# Patient Record
Sex: MALE | Race: WHITE
[De-identification: names, ages, dates, MRNs, and addresses within clinical notes are randomized per-mention and may not be internally consistent; named-entity substitution may affect disease eponyms.]

---

## 2018-03-29 ENCOUNTER — HOSPITAL ENCOUNTER (INPATIENT)
Dept: HOSPITAL 74 - YASAS | Age: 48
LOS: 4 days | Discharge: OTHER INSTITUTION | DRG: 773 | End: 2018-04-02
Attending: INTERNAL MEDICINE | Admitting: INTERNAL MEDICINE
Payer: COMMERCIAL

## 2018-03-29 VITALS — BODY MASS INDEX: 22.3 KG/M2

## 2018-03-29 DIAGNOSIS — F10.230: ICD-10-CM

## 2018-03-29 DIAGNOSIS — Z88.8: ICD-10-CM

## 2018-03-29 DIAGNOSIS — F11.23: Primary | ICD-10-CM

## 2018-03-29 DIAGNOSIS — F32.9: ICD-10-CM

## 2018-03-29 DIAGNOSIS — J20.9: ICD-10-CM

## 2018-03-29 DIAGNOSIS — F19.24: ICD-10-CM

## 2018-03-29 DIAGNOSIS — F14.20: ICD-10-CM

## 2018-03-29 DIAGNOSIS — R63.4: ICD-10-CM

## 2018-03-29 PROCEDURE — HZ2ZZZZ DETOXIFICATION SERVICES FOR SUBSTANCE ABUSE TREATMENT: ICD-10-PCS | Performed by: INTERNAL MEDICINE

## 2018-03-29 RX ADMIN — Medication SCH MG: at 22:26

## 2018-03-29 RX ADMIN — HYDROXYZINE PAMOATE PRN MG: 50 CAPSULE ORAL at 22:26

## 2018-03-29 RX ADMIN — Medication PRN MG: at 22:26

## 2018-03-29 NOTE — HP
COWS





- Scale


Resting Pulse: 0= [place] 80 or Below


Sweatin= Chills/Flushing


Restless Observation: 1= Difficult to Sit Still


Pupil Size: 0= Normal to Room Light


Bone or Joint Aches: 1= Mild Discomfort


Runny Nose/ Eye Tearin= Constantly Teary/Runny


GI Upset > 30mins: 2= Nausea/Diarrhea (BM x 2)


Tremor Observation: 1= Tremor Felt, Not Seen


Yawning Observation: 1= 1-2x During Session


Anxiety or Irritability: 1=Feels Anxious/Irritable


Goose Flesh Skin: 0=Smooth Skin


COWS Score: 12





CIWA Score





- CIWA Score


Nausea/Vomiting: 3


Muscle Tremors: 2


Anxiety: 2


Agitation: 1-Slight > Activity


Paroxysmal Sweats: 3


Orientation: 0-Oriented


Tacttile Disturbances: 0-None


Auditory Disturbances: 0-None


Visual Disturbances: 1-Very Mild Sensitivity


Headache: 0-None Present


CIWA-Ar Total Score: 12





[facility]





- HPI


Chief Complaint: 





alcohol and heroin withdrawal symptoms 


Allergies/Adverse Reactions: 


 Allergies











Allergy/AdvReac Type Severity Reaction Status Date / Time


 


carbamazepine [From Tegretol] Allergy Intermediate Swelling Verified 18 17

:11


 


levetiracetam [From Keppra] Allergy Intermediate Swelling Verified 18 17:

11


 


phenobarbital Allergy Intermediate Swelling Verified 18 17:11


 


phenytoin sodium Allergy Intermediate Swelling Verified 18 17:11





[From Dilantin]     


 


phenytoin sodium extended Allergy Intermediate Swelling Verified 18 17:11





[From Dilantin]     











History of Present Illness: 





46 yo male with IV heroin, nicotine, cocaine and alcohol dependence is here 

seeking detox. 


PMHX: depression and insomnia, Hep C not treated, alcohol related seizure 


Last detox 2 months ago at [facility]. 


Denies suicidal / homicidal ideation or suicide attempts.





Exam Limitations: No Limitations





- Ebola screening


Have you traveled outside of the country in the last 21 days: No


Have you had contact with anyone from an Ebola affected area: No


Have you been sick,other than usual withdrawal symptoms: No


Do you have a fever: No





- Review of Systems


Constitutional: Chills, Diaphoresis, Changes in sleep, Weakness, Unintentional 

Wgt. Loss (10 lbs in the past two months)


EENT: reports: No Symptoms Reported


Respiratory: reports: No Symptoms reported


Cardiac: reports: No Symptoms Reported


GI: reports: Diarrhea, Nausea, Poor Fluid Intake, Vomiting (2x), Indigestion


: reports: No Symptoms Reported


Musculoskeletal: reports: Back Pain


Integumentary: reports: No Symptoms Reported


Neuro: reports: Weakness


Endocrine: reports: Increased Thirst


Hematology: reports: No Symptoms Reported


Psychiatric: reports: Orientated x3, Anxious


Other Systems: Reviewed and Negative





Patient History





- Patient Medical History


Hx Anemia: No


Hx Asthma: No


Hx Chronic Obstructive Pulmonary Disease (COPD): No


Hx Cancer: No


Hx Cardiac Disorders: No


Hx Congestive Heart Failure: No


Hx Hypertension: No


Hx Hypercholesterolemia: No


Hx Pacemaker: No


HX Cerebrovascular Accident: No


Hx Seizures: Yes (ALCOHOL WITHDRAWAL RELATED )


Hx Dementia: No


Hx Diabetes: No


Hx Gastrointestinal Disorders: No


Hx Liver Disease: No


Hx Genitourinary Disorders: No


Hx Sexually Transmitted Disorders: No


Hx Renal Disease (ESRD): No


Hx Thyroid Disease: No


Hx Human Immunodeficiency Virus (HIV): No


Hx Hepatitis C: Yes (treatment)


Hx Depression: Yes


Hx Suicide Attempt: No


Hx Bipolar Disorder: No


Hx Schizophrenia: No





- Patient Surgical History


Past Surgical History: No





- PPD History


Previous Implant?: Yes


Documented Results: Negative w/proof


Date: 10/07/16


PPD to be Administered?: Yes





- Reproductive History


Patient is a Female of Child Bearing Age (11 -55 yrs old): No





- Smoking Cessation


Smoking history: Current every day smoker


Aproximately how many cigarettes per day: 10


Hx Chewing Tobacco Use: No


Initiated information on smoking cessation: Yes


'Breaking Loose' booklet given: 18





- Substance & Tx. History


Hx Alcohol Use: Yes


Hx Substance Use: Yes


Substance Use Type: Alcohol, Cocaine, Heroin


Hx Substance Use Treatment: Yes ([facility] 2 months ago)





- Substances Abused


  ** Alcohol


Route: Oral


Frequency: Daily


Amount used: 2 PINTS


Age of first use: 14


Date of Last Use: 18





  ** Cocaine


Route: Oral


Frequency: Daily


Amount used: $20


Age of first use: 17


Date of Last Use: 18





  ** Heroin


Route: Injection


Frequency: Daily


Amount used: 1 BUNDLE


Age of first use: 17


Date of Last Use: 18





Family Disease History





- Family Disease History


Family Disease History: Other: Father (, [place]), Mother (, [place] )





Admission Physical Exam [facility]





- Vital Signs


Vital Signs: 


 Vital Signs - 24 hr











  18





  16:22


 


Temperature 98.3 F


 


Pulse Rate 80


 


Respiratory 18





Rate 


 


Blood Pressure 118/60














- Physical


General Appearance: Yes: Disheveled, Mild Distress, Thin, Sweating, Anxious


HEENTM: Yes: EOMI, Hearing grossly Normal, Normal ENT Inspection, Pharynx Normal

, Tm's normal, Rhinorrhea, Other (dry mucous membranes)


Respiratory: Yes: Chest Non-Tender, Lungs Clear, No Respiratory Distress, No 

Accessory Muscle Use, Wheezing (left lower lobe)


Neck: Yes: No masses,lesions,Nodules, Trachea in good position


Breast: Yes: Breast Exam Deferred


Cardiology: Yes: Regular Rhythm, Regular Rate


Abdominal: Yes: Normal Bowel Sounds, Non Tender, Flat, Soft


Genitourinary: Yes: Within Normal Limits


Back: Yes: Normal Inspection


Musculoskeletal: Yes: full range of Motion, Gait Steady, Pelvis Stable, Back 

pain


Extremities: Yes: Normal Capillary Refill, Normal Inspection, Normal Range of 

Motion, Non-Tender


Neurological: Yes: CNs II-XII NML intact, Fully Oriented, Alert, Motor Strength 

5/5, Depressed Affect


Integumentary: Yes: Normal Color, Warm, Moist


Lymphatic: Yes: Within Normal Limits





- Diagnostic


(1) Dehydration


Current Visit: Yes   Status: Acute   





(2) Wheezing


Current Visit: Yes   Status: Acute   





(3) Cough


Current Visit: Yes   Status: Acute   





(4) Alcohol dependence with uncomplicated withdrawal


Current Visit: Yes   Status: Acute   





(5) Cocaine dependence, uncomplicated


Current Visit: Yes   Status: Acute   





(6) Hepatitis C


Current Visit: Yes   Status: Chronic   


Qualifiers: 


   Viral hepatitis chronicity: chronic   Hepatic coma status: without hepatic 

coma   Qualified Code(s): B18.2 - Chronic viral hepatitis C   





(7) Opioid dependence with withdrawal


Current Visit: Yes   Status: Acute   





(8) IV drug user


Current Visit: Yes   Status: Acute   





(9) Weight loss


Current Visit: Yes   Status: Acute   





Cleared for Admission [facility]





- Detox or Rehab


[facility] Level of Care: Medically Managed


Detox Regimen/Protocol: Methadone/Librium





[facility] Breath Alcohol Content


Breath Alcohol Content: 0





Urine Drug Screen





- Results


Drug Screen Negative: No


Urine Drug Screen Results: FELISA-Cocaine, OPI-Opiates

## 2018-03-30 LAB
ALBUMIN SERPL-MCNC: 3.1 G/DL (ref 3.4–5)
ALP SERPL-CCNC: 72 U/L (ref 45–117)
ALT SERPL-CCNC: 29 U/L (ref 12–78)
ANION GAP SERPL CALC-SCNC: 5 MMOL/L (ref 8–16)
APPEARANCE UR: CLEAR
AST SERPL-CCNC: 19 U/L (ref 15–37)
BILIRUB SERPL-MCNC: 0.2 MG/DL (ref 0.2–1)
BILIRUB UR STRIP.AUTO-MCNC: 2 MG/DL
BUN SERPL-MCNC: 12 MG/DL (ref 7–18)
CALCIUM SERPL-MCNC: 8.6 MG/DL (ref 8.5–10.1)
CHLORIDE SERPL-SCNC: 107 MMOL/L (ref 98–107)
CO2 SERPL-SCNC: 29 MMOL/L (ref 21–32)
COLOR UR: YELLOW
CREAT SERPL-MCNC: 0.8 MG/DL (ref 0.7–1.3)
DEPRECATED RDW RBC AUTO: 14.2 % (ref 11.9–15.9)
GLUCOSE SERPL-MCNC: 98 MG/DL (ref 74–106)
HCT VFR BLD CALC: 41.1 % (ref 35.4–49)
HGB BLD-MCNC: 13.6 GM/DL (ref 11.7–16.9)
KETONES UR QL STRIP: NEGATIVE
LEUKOCYTE ESTERASE UR QL STRIP.AUTO: NEGATIVE
MCH RBC QN AUTO: 29.1 PG (ref 25.7–33.7)
MCHC RBC AUTO-ENTMCNC: 33.1 G/DL (ref 32–35.9)
MCV RBC: 87.8 FL (ref 80–96)
NITRITE UR QL STRIP: NEGATIVE
PH UR: 5 [PH] (ref 5–8)
PLATELET # BLD AUTO: 234 K/MM3 (ref 134–434)
PMV BLD: 9.3 FL (ref 7.5–11.1)
POTASSIUM SERPLBLD-SCNC: 3.6 MMOL/L (ref 3.5–5.1)
PROT SERPL-MCNC: 6.4 G/DL (ref 6.4–8.2)
PROT UR QL STRIP: NEGATIVE
PROT UR QL STRIP: NEGATIVE
RBC # BLD AUTO: 4.68 M/MM3 (ref 4–5.6)
RBC # UR STRIP: NEGATIVE /UL
SODIUM SERPL-SCNC: 141 MMOL/L (ref 136–145)
SP GR UR: 1.03 (ref 1–1.03)
UROBILINOGEN UR STRIP-MCNC: 2 MG/DL (ref 0.2–1)
WBC # BLD AUTO: 7.4 K/MM3 (ref 4–10)

## 2018-03-30 RX ADMIN — GUAIFENESIN AND DEXTROMETHORPHAN PRN ML: 100; 10 SYRUP ORAL at 05:34

## 2018-03-30 RX ADMIN — GUAIFENESIN AND DEXTROMETHORPHAN PRN ML: 100; 10 SYRUP ORAL at 12:16

## 2018-03-30 RX ADMIN — PHENOL PRN EACH: 14.5 LOZENGE ORAL at 05:35

## 2018-03-30 RX ADMIN — Medication SCH MG: at 22:24

## 2018-03-30 RX ADMIN — Medication SCH TAB: at 11:29

## 2018-03-30 RX ADMIN — HYDROXYZINE PAMOATE PRN MG: 50 CAPSULE ORAL at 11:29

## 2018-03-30 RX ADMIN — Medication PRN MG: at 22:24

## 2018-03-30 RX ADMIN — AMOXICILLIN SCH MG: 500 CAPSULE ORAL at 14:48

## 2018-03-30 RX ADMIN — AMOXICILLIN SCH MG: 500 CAPSULE ORAL at 22:24

## 2018-03-30 RX ADMIN — PHENOL PRN EACH: 14.5 LOZENGE ORAL at 12:16

## 2018-03-30 RX ADMIN — NICOTINE SCH MG: 14 PATCH, EXTENDED RELEASE TRANSDERMAL at 11:30

## 2018-03-30 NOTE — EKG
Test Reason : 

Blood Pressure : ***/*** mmHG

Vent. Rate : 073 BPM     Atrial Rate : 073 BPM

   P-R Int : 122 ms          QRS Dur : 090 ms

    QT Int : 392 ms       P-R-T Axes : 061 070 050 degrees

   QTc Int : 431 ms

 

NORMAL SINUS RHYTHM

NONSPECIFIC T WAVE ABNORMALITY

NO PREVIOUS ECGS AVAILABLE

Confirmed by STEPHON GARCIA MD ([id number]) on 3/30/2018 10:08:06 AM

 

Referred By:             Confirmed By:STEPHON GARCIA MD

## 2018-03-30 NOTE — PN
[facility] CIWA





- CIWA Score


Nausea/Vomiting: 3


Muscle Tremors: 3


Anxiety: 3


Agitation: 3


Paroxysmal Sweats: 1-Minimal Palms Moist


Orientation: 0-Oriented


Tacttile Disturbances: 1-Very Mild Itch/Numbness


Auditory Disturbances: 1-Very Mild


Visual Disturbances: 0-None


Headache: 2-Mild


CIWA-Ar Total Score: 17





[facility] COWS





- Scale


Resting Pulse: 1= [place] 


Sweatin= Chills/Flushing


Restless Observation: 3= Extraneous Movement


Pupil Size: 1= Pupils >than Normal


Bone or Joint Aches: 2= Severe Diffuse Aches


Runny Nose/ Eye Tearin= Runny Nose/Eyes


GI Upset > 30mins: 3= Vomiting/Diarrhea


Tremor Observation of Outstretched Hands: 2= Slight Tremor Visible


Yawning Observation: 1= 1-2x During Session


Anxiety or Irritability: 2=Irritable/Anxious


Goose Flesh Skin: 0=Smooth Skin


COWS Score: 18





BHS Progress Note (SOAP)


Subjective: 





ALERT,IRRITABLE,ANXIOUS,INTERRUPTED SLEEP,TREMOR,PAIN IN THE BODY AND BACK


Objective: 





18 12:34


 Vital Signs











Temperature  98.2 F   18 10:00


 


Pulse Rate  82   18 10:00


 


Respiratory Rate  17   18 10:00


 


Blood Pressure  116/83   18 10:00


 


O2 Sat by Pulse Oximetry (%)      








EKG REPEAT NSR,NORMAL ECG


18 12:35


 Laboratory Last Values











WBC  7.4 K/mm3 (4.0-10.0)  D 18  08:00    


 


RBC  4.68 M/mm3 (4.00-5.60)   18  08:00    


 


Hgb  13.6 GM/dL (11.7-16.9)  D 18  08:00    


 


Hct  41.1 % (35.4-49)   18  08:00    


 


MCV  87.8 fl (80-96)   18  08:00    


 


MCH  29.1 pg (25.7-33.7)   18  08:00    


 


MCHC  33.1 g/dl (32.0-35.9)   18  08:00    


 


RDW  14.2 % (11.9-15.9)   18  08:00    


 


Plt Count  234 K/MM3 (134-434)   18  08:00    


 


MPV  9.3 fl (7.5-11.1)   18  08:00    


 


Sodium  141 mmol/L (136-145)   18  08:00    


 


Potassium  3.6 mmol/L (3.5-5.1)   18  08:00    


 


Chloride  107 mmol/L ()   18  08:00    


 


Carbon Dioxide  29 mmol/L (21-32)   18  08:00    


 


Anion Gap  5  (8-16)  L  18  08:00    


 


BUN  12 mg/dL (7-18)   18  08:00    


 


Creatinine  0.8 mg/dL (0.7-1.3)   18  08:00    


 


Creat Clearance w eGFR  > 60  (>60)   18  08:00    


 


Random Glucose  98 mg/dL ()  D 18  08:00    


 


Calcium  8.6 mg/dL (8.5-10.1)   18  08:00    


 


Total Bilirubin  0.2 mg/dL (0.2-1.0)  D 18  08:00    


 


AST  19 U/L (15-37)  D 18  08:00    


 


ALT  29 U/L (12-78)   18  08:00    


 


Alkaline Phosphatase  72 U/L ()  D 18  08:00    


 


Total Protein  6.4 g/dl (6.4-8.2)   18  08:00    


 


Albumin  3.1 g/dl (3.4-5.0)  L  18  08:00    


 


Urine Color  Yellow   18  00:26    


 


Urine Appearance  Clear   18  00:26    


 


Urine pH  5.0  (5.0-8.0)   18  00:26    


 


Ur Specific Gravity  1.033  (1.001-1.035)   18  00:26    


 


Urine Protein  Negative  (NEGATIVE)   18  00:26    


 


Urine Glucose (UA)  Negative  (NEGATIVE)   18  00:26    


 


Urine Ketones  Negative  (NEGATIVE)   18  00:26    


 


Urine Blood  Negative  (NEGATIVE)   18  00:26    


 


Urine Nitrite  Negative  (NEGATIVE)   18  00:26    


 


Urine Bilirubin  2.0  (<2.0 mg/dL)   18  00:26    


 


Urine Urobilinogen  2.0 mg/dL (0.2-1.0)   18  00:26    


 


Ur Leukocyte Esterase  Negative  (NEGATIVE)   18  00:26    


 


RPR Titer  Nonreactive  (NONREACTIVE)   18  08:00    











Assessment: 





18 12:35


WITHDRAWAL SYMPTOM


Plan: 





CONTINUE DETOX

## 2018-03-30 NOTE — CONSULT
[facility] Psychiatric Consult





- Data


Date of interview: 03/30/18


Admission source: [facility]


Identifying data: Readmission to [facility] for this 46 y/o  male 

seeking detox treatment on [address] for heroin,cocaine and alcohol 

dependence.Patient is single,without children,homeless,unemployed and deprived 

of income.


Substance Abuse History: Discussed.patient admits to current use of heroin,

alcohol and cocaine .See details in the following section of current BHS report 

: Smoking history: Current every day smoker.  Aproximately how many cigarettes 

per day: 10.  Hx Chewing Tobacco Use: No.  Initiated information on smoking 

cessation: Yes.  'Breaking Loose' booklet given: 03/29/18.  - Substance & Tx. 

History.  Hx Alcohol Use: Yes.  Hx Substance Use: Yes.  Substance Use Type: 

Alcohol, Cocaine, Heroin.  Hx Substance Use Treatment: Yes ([facility] 2 

months ago).  - Substances Abused.  ** Alcohol.  Route: Oral.  Frequency: 

Daily.  Amount used: 2 PINTS.  Age of first use: 14.  Date of Last Use: 03/29/ 18.  ** Cocaine.  Route: Oral.  Frequency: Daily.  Amount used: $20.  Age of 

first use: 17.  Date of Last Use: 03/29/18.  ** Heroin.  Route: Injection.  

Frequency: Daily.  Amount used: 1 BUNDLE.  Age of first use: 17.  Date of Last 

Use: 03/29/18


Medical History: Hepatitis C and a history of withdrawal-related seizures.


Psychiatric History: Patient denies.


Physical/Sexual Abuse/Trauma History: Patient denies.


Additional Comment: Urine Drug Screen Results: FELISA-Cocaine, OPI-Opiates.Noted.





Mental Status Exam





- Mental Status Exam


Alert and Oriented to: Time, Place, Person


Cognitive Function: Grossly Intact


Patient Appearance: Unkempt, Disheveled


Mood: Angry, Withdrawn, Irritable


Affect: Mood Congruent, Constricted


Patient Behavior: Fatigued, Uncooperative


Speech Pattern: Clear, Inappropriate (verbally abusive to staff)


Voice Loudness: Normal


Thought Process: Goal Oriented


Thought Disorder: Not Present


Hallucinations: Denies


Suicidal Ideation: Denies


Homicidal Ideation: Denies


Insight/Judgement: Poor


Sleep: Well


Appetite: Fair


Muscle strength/Tone: Normal


Gait/Station: Normal





Psychiatric Findings





- Problem List (Axis 1, 2,3)


(1) Alcohol dependence with uncomplicated withdrawal


Current Visit: Yes   Status: Acute   





(2) Cocaine dependence, uncomplicated


Current Visit: Yes   Status: Acute   





(3) Opioid dependence with withdrawal


Current Visit: Yes   Status: Acute   





(4) Substance induced mood disorder


Current Visit: Yes   Status: Acute   





- Initial Treatment Plan


Initial Treatment Plan: Psychoeducation is rejected by the 

patient.Detoxification.Observation.

## 2018-03-31 RX ADMIN — AMOXICILLIN SCH MG: 500 CAPSULE ORAL at 13:58

## 2018-03-31 RX ADMIN — NICOTINE SCH: 14 PATCH, EXTENDED RELEASE TRANSDERMAL at 10:46

## 2018-03-31 RX ADMIN — GUAIFENESIN AND DEXTROMETHORPHAN PRN ML: 100; 10 SYRUP ORAL at 13:58

## 2018-03-31 RX ADMIN — METHADONE HYDROCHLORIDE SCH MG: 5 TABLET ORAL at 10:46

## 2018-03-31 RX ADMIN — AMOXICILLIN SCH MG: 500 CAPSULE ORAL at 22:40

## 2018-03-31 RX ADMIN — Medication SCH MG: at 22:40

## 2018-03-31 RX ADMIN — HYDROXYZINE PAMOATE PRN MG: 50 CAPSULE ORAL at 13:57

## 2018-03-31 RX ADMIN — Medication SCH TAB: at 10:45

## 2018-03-31 RX ADMIN — AMOXICILLIN SCH MG: 500 CAPSULE ORAL at 05:23

## 2018-03-31 NOTE — EKG
Test Reason : 

Blood Pressure : ***/*** mmHG

Vent. Rate : 069 BPM     Atrial Rate : 069 BPM

   P-R Int : 124 ms          QRS Dur : 088 ms

    QT Int : 404 ms       P-R-T Axes : 053 067 048 degrees

   QTc Int : 432 ms

 

NORMAL SINUS RHYTHM

NORMAL ECG

WHEN COMPARED WITH ECG OF 29-MAR-2018 19:33,

NO SIGNIFICANT CHANGE WAS FOUND

Confirmed by MD LOONEY MOYSES ([id number]) on 3/31/2018 6:33:45 PM

 

Referred By:             Confirmed By:GENIA LOONEY MD

## 2018-03-31 NOTE — PN
[facility] Progress Note


Note: 





PATIENT REQUESTED REGIMEN TO CHANGE TO METHADONE AND VALIUM,INSTEAD OF 

METHADONE AND LIBRIUM

## 2018-03-31 NOTE — PN
S CIWA





- CIWA Score


Nausea/Vomiting: 3


Muscle Tremors: 3


Anxiety: 3


Agitation: 3


Paroxysmal Sweats: 2


Orientation: 0-Oriented


Tacttile Disturbances: 1-Very Mild Itch/Numbness


Auditory Disturbances: 1-Very Mild


Visual Disturbances: 0-None


Headache: 1-Very Mild


CIWA-Ar Total Score: 17





[facility]HS COWS





- Scale


Resting Pulse: 0= [place] 80 or Below


Sweatin= Chills/Flushing


Restless Observation: 3= Extraneous Movement


Pupil Size: 1= Pupils >than Normal


Bone or Joint Aches: 2= Severe Diffuse Aches


Runny Nose/ Eye Tearin= Runny Nose/Eyes


GI Upset > 30mins: 2= Nausea/Diarrhea


Tremor Observation of Outstretched Hands: 2= Slight Tremor Visible


Yawning Observation: 1= 1-2x During Session


Anxiety or Irritability: 2=Irritable/Anxious


Goose Flesh Skin: 0=Smooth Skin


COWS Score: 16





BHS Progress Note (SOAP)


Subjective: 





ALERT,IRRITABLE,ANXIOUS,INTERRUPTED SLEEP,TREMOR,PAIN IN THE BODY AND BACK


Objective: 





18 14:33


 Vital Signs











Temperature  97.9 F   18 11:17


 


Pulse Rate  73   18 11:17


 


Respiratory Rate  18   18 11:17


 


Blood Pressure  120/69   18 11:17


 


O2 Sat by Pulse Oximetry (%)      








 Laboratory Last Values











WBC  7.4 K/mm3 (4.0-10.0)  D 18  08:00    


 


RBC  4.68 M/mm3 (4.00-5.60)   18  08:00    


 


Hgb  13.6 GM/dL (11.7-16.9)  D 18  08:00    


 


Hct  41.1 % (35.4-49)   18  08:00    


 


MCV  87.8 fl (80-96)   18  08:00    


 


MCH  29.1 pg (25.7-33.7)   18  08:00    


 


MCHC  33.1 g/dl (32.0-35.9)   18  08:00    


 


RDW  14.2 % (11.9-15.9)   18  08:00    


 


Plt Count  234 K/MM3 (134-434)   18  08:00    


 


MPV  9.3 fl (7.5-11.1)   18  08:00    


 


Sodium  141 mmol/L (136-145)   18  08:00    


 


Potassium  3.6 mmol/L (3.5-5.1)   18  08:00    


 


Chloride  107 mmol/L ()   18  08:00    


 


Carbon Dioxide  29 mmol/L (21-32)   18  08:00    


 


Anion Gap  5  (8-16)  L  18  08:00    


 


BUN  12 mg/dL (7-18)   18  08:00    


 


Creatinine  0.8 mg/dL (0.7-1.3)   18  08:00    


 


Creat Clearance w eGFR  > 60  (>60)   18  08:00    


 


Random Glucose  98 mg/dL ()  D 18  08:00    


 


Calcium  8.6 mg/dL (8.5-10.1)   18  08:00    


 


Total Bilirubin  0.2 mg/dL (0.2-1.0)  D 18  08:00    


 


AST  19 U/L (15-37)  D 18  08:00    


 


ALT  29 U/L (12-78)   18  08:00    


 


Alkaline Phosphatase  72 U/L ()  D 18  08:00    


 


Total Protein  6.4 g/dl (6.4-8.2)   18  08:00    


 


Albumin  3.1 g/dl (3.4-5.0)  L  18  08:00    


 


Urine Color  Yellow   18  00:26    


 


Urine Appearance  Clear   18  00:26    


 


Urine pH  5.0  (5.0-8.0)   18  00:26    


 


Ur Specific Gravity  1.033  (1.001-1.035)   18  00:26    


 


Urine Protein  Negative  (NEGATIVE)   18  00:26    


 


Urine Glucose (UA)  Negative  (NEGATIVE)   18  00:26    


 


Urine Ketones  Negative  (NEGATIVE)   18  00:26    


 


Urine Blood  Negative  (NEGATIVE)   18  00:    


 


Urine Nitrite  Negative  (NEGATIVE)   18  00:26    


 


Urine Bilirubin  2.0  (<2.0 mg/dL)   18  00:26    


 


Urine Urobilinogen  2.0 mg/dL (0.2-1.0)   18  00:26    


 


Ur Leukocyte Esterase  Negative  (NEGATIVE)   18  00:26    


 


RPR Titer  Nonreactive  (NONREACTIVE)   18  08:00    











18 14:35


CHEST X RAY NO ACTIVE DISEASE


Assessment: 





18 14:35


WITHDRAWAL SYMPTOM


Plan: 





CONTINUE DETOX,REGIMEN CHANGED TO METHADONE AND VALIUM BY PATIENT'S REQUEST

## 2018-04-01 RX ADMIN — NICOTINE SCH: 14 PATCH, EXTENDED RELEASE TRANSDERMAL at 11:01

## 2018-04-01 RX ADMIN — METHADONE HYDROCHLORIDE SCH MG: 5 TABLET ORAL at 11:01

## 2018-04-01 RX ADMIN — Medication SCH TAB: at 11:01

## 2018-04-01 RX ADMIN — HYDROXYZINE PAMOATE PRN MG: 50 CAPSULE ORAL at 13:16

## 2018-04-01 RX ADMIN — AMOXICILLIN SCH MG: 500 CAPSULE ORAL at 05:49

## 2018-04-01 RX ADMIN — Medication SCH MG: at 22:28

## 2018-04-01 RX ADMIN — AMOXICILLIN SCH MG: 500 CAPSULE ORAL at 22:29

## 2018-04-01 RX ADMIN — AMOXICILLIN SCH MG: 500 CAPSULE ORAL at 14:22

## 2018-04-01 NOTE — PN
[facility]HS Progress Note (SOAP)


Subjective: 





joint paint muscle ache sweat anxiety restlessness tremor trouble sleeping


Objective: 





04/01/18 14:39


 Vital Signs











Temperature  97.7 F   04/01/18 13:09


 


Pulse Rate  75   04/01/18 13:09


 


Respiratory Rate  18   04/01/18 13:09


 


Blood Pressure  119/77   04/01/18 13:09


 


O2 Sat by Pulse Oximetry (%)      








 Laboratory Last Values











WBC  7.4 K/mm3 (4.0-10.0)  D 03/30/18  08:00    


 


RBC  4.68 M/mm3 (4.00-5.60)   03/30/18  08:00    


 


Hgb  13.6 GM/dL (11.7-16.9)  D 03/30/18  08:00    


 


Hct  41.1 % (35.4-49)   03/30/18  08:00    


 


MCV  87.8 fl (80-96)   03/30/18  08:00    


 


MCH  29.1 pg (25.7-33.7)   03/30/18  08:00    


 


MCHC  33.1 g/dl (32.0-35.9)   03/30/18  08:00    


 


RDW  14.2 % (11.9-15.9)   03/30/18  08:00    


 


Plt Count  234 K/MM3 (134-434)   03/30/18  08:00    


 


MPV  9.3 fl (7.5-11.1)   03/30/18  08:00    


 


Sodium  141 mmol/L (136-145)   03/30/18  08:00    


 


Potassium  3.6 mmol/L (3.5-5.1)   03/30/18  08:00    


 


Chloride  107 mmol/L ()   03/30/18  08:00    


 


Carbon Dioxide  29 mmol/L (21-32)   03/30/18  08:00    


 


Anion Gap  5  (8-16)  L  03/30/18  08:00    


 


BUN  12 mg/dL (7-18)   03/30/18  08:00    


 


Creatinine  0.8 mg/dL (0.7-1.3)   03/30/18  08:00    


 


Creat Clearance w eGFR  > 60  (>60)   03/30/18  08:00    


 


Random Glucose  98 mg/dL ()  D 03/30/18  08:00    


 


Calcium  8.6 mg/dL (8.5-10.1)   03/30/18  08:00    


 


Total Bilirubin  0.2 mg/dL (0.2-1.0)  D 03/30/18  08:00    


 


AST  19 U/L (15-37)  D 03/30/18  08:00    


 


ALT  29 U/L (12-78)   03/30/18  08:00    


 


Alkaline Phosphatase  72 U/L ()  D 03/30/18  08:00    


 


Total Protein  6.4 g/dl (6.4-8.2)   03/30/18  08:00    


 


Albumin  3.1 g/dl (3.4-5.0)  L  03/30/18  08:00    


 


Urine Color  Yellow   03/30/18  00:26    


 


Urine Appearance  Clear   03/30/18  00:26    


 


Urine pH  5.0  (5.0-8.0)   03/30/18  00:26    


 


Ur Specific Gravity  1.033  (1.001-1.035)   03/30/18  00:26    


 


Urine Protein  Negative  (NEGATIVE)   03/30/18  00:26    


 


Urine Glucose (UA)  Negative  (NEGATIVE)   03/30/18  00:26    


 


Urine Ketones  Negative  (NEGATIVE)   03/30/18  00:26    


 


Urine Blood  Negative  (NEGATIVE)   03/30/18  00:26    


 


Urine Nitrite  Negative  (NEGATIVE)   03/30/18  00:26    


 


Urine Bilirubin  2.0  (<2.0 mg/dL)   03/30/18  00:26    


 


Urine Urobilinogen  2.0 mg/dL (0.2-1.0)   03/30/18  00:26    


 


Ur Leukocyte Esterase  Negative  (NEGATIVE)   03/30/18  00:26    


 


RPR Titer  Nonreactive  (NONREACTIVE)   03/30/18  08:00    








lab noted


Assessment: 





04/01/18 14:40


withdrawal sx


Plan: 





continue detox

## 2018-04-02 ENCOUNTER — HOSPITAL ENCOUNTER (INPATIENT)
Dept: HOSPITAL 74 - YASAS | Age: 48
LOS: 14 days | Discharge: HOME | DRG: 772 | End: 2018-04-16
Attending: PSYCHIATRY & NEUROLOGY | Admitting: PSYCHIATRY & NEUROLOGY
Payer: COMMERCIAL

## 2018-04-02 VITALS — DIASTOLIC BLOOD PRESSURE: 82 MMHG | TEMPERATURE: 97.1 F | SYSTOLIC BLOOD PRESSURE: 131 MMHG | HEART RATE: 72 BPM

## 2018-04-02 DIAGNOSIS — F19.24: ICD-10-CM

## 2018-04-02 DIAGNOSIS — F19.282: ICD-10-CM

## 2018-04-02 DIAGNOSIS — F11.23: Primary | ICD-10-CM

## 2018-04-02 DIAGNOSIS — F17.210: ICD-10-CM

## 2018-04-02 DIAGNOSIS — F14.20: ICD-10-CM

## 2018-04-02 DIAGNOSIS — F10.230: ICD-10-CM

## 2018-04-02 DIAGNOSIS — Z91.013: ICD-10-CM

## 2018-04-02 DIAGNOSIS — Z88.8: ICD-10-CM

## 2018-04-02 DIAGNOSIS — Z51.81: ICD-10-CM

## 2018-04-02 PROCEDURE — HZ42ZZZ GROUP COUNSELING FOR SUBSTANCE ABUSE TREATMENT, COGNITIVE-BEHAVIORAL: ICD-10-PCS | Performed by: PSYCHIATRY & NEUROLOGY

## 2018-04-02 RX ADMIN — AMOXICILLIN SCH MG: 500 CAPSULE ORAL at 05:37

## 2018-04-02 RX ADMIN — Medication SCH MG: at 21:51

## 2018-04-02 RX ADMIN — AMOXICILLIN SCH MG: 500 CAPSULE ORAL at 14:16

## 2018-04-02 RX ADMIN — NICOTINE SCH: 14 PATCH, EXTENDED RELEASE TRANSDERMAL at 10:40

## 2018-04-02 RX ADMIN — Medication PRN MG: at 21:51

## 2018-04-02 RX ADMIN — Medication SCH TAB: at 10:40

## 2018-04-02 RX ADMIN — HYDROXYZINE PAMOATE PRN MG: 50 CAPSULE ORAL at 21:52

## 2018-04-02 NOTE — DS
[facility] Detox Discharge Summary


Admission Date: 


03/29/18





Discharge Date: 04/02/18





- History


Present History: Alcohol Dependence, Opioid Dependence


Additional Comments: 





FOLLOW UP WITH AFTER CARE PROGRAM AS ARRANGEMENT


Pertinent Past History: 





IV DRUG USER


HEPATITIS C


WEIGHT LOSS


DEHYDRATION


SEIZURE


ACUTE BRONCHITIS





- Physical Exam Results


Vital Signs: 


 Vital Signs











Temperature  97.7 F   04/02/18 09:04


 


Pulse Rate  66   04/02/18 09:04


 


Respiratory Rate  18   04/02/18 09:04


 


Blood Pressure  136/72   04/02/18 09:04


 


O2 Sat by Pulse Oximetry (%)      











Pertinent Admission Physical Exam Findings: 





WITHDRAWAL SIGNS AND SYMPTOM





- Treatment


Hospital Course: Detox Protocol Followed, Detoxed Safely, Responded well, 

Discharged Condition Good, Rehab Referral Accepted


Patient has Accepted a Rehab Referral to: [facility]





- Medication


Discharge Medications: 


Ambulatory Orders





NK [No Known Home Medication]  03/29/18 











- Diagnosis


(1) Opioid dependence with withdrawal


Current Visit: Yes   Status: Acute   





(2) Alcohol dependence with uncomplicated withdrawal


Current Visit: Yes   Status: Acute   





(3) Cocaine dependence, uncomplicated


Current Visit: Yes   Status: Acute   





(4) Hepatitis C


Current Visit: Yes   Status: Chronic   


Qualifiers: 


   Viral hepatitis chronicity: chronic   Hepatic coma status: without hepatic 

coma   Qualified Code(s): B18.2 - Chronic viral hepatitis C   





(5) Seizure


Current Visit: No   Status: Chronic   





(6) Acute bronchitis


Current Visit: Yes   Status: Acute   





(7) Dehydration


Current Visit: Yes   Status: Acute   





(8) History of seizure


Current Visit: Yes   Status: Acute   





(9) IV drug user


Current Visit: Yes   Status: Acute   





(10) Weight loss


Current Visit: Yes   Status: Acute   





(11) Cocaine addiction


Current Visit: No   Status: Chronic   


Qualifiers: 


   Substance use status: uncomplicated   Qualified Code(s): F14.20 - Cocaine 

dependence, uncomplicated   





- AMA


Did Patient Leave Against Medical Advice: No

## 2018-04-02 NOTE — PN
[facility] Progress Note (SOAP)


Subjective: 





ALERT,NO COMPLAINT


Objective: 





04/02/18 11:03


 Vital Signs











Temperature  97.7 F   04/02/18 09:04


 


Pulse Rate  66   04/02/18 09:04


 


Respiratory Rate  18   04/02/18 09:04


 


Blood Pressure  136/72   04/02/18 09:04


 


O2 Sat by Pulse Oximetry (%)      











Assessment: 





04/02/18 11:03


STABLE FOR DISCHARGE TODAY


Plan: 





FOLLOW UP WITH AFTER CARE PROGRAM REVELATION AS ARRANGEMENT

## 2018-04-02 NOTE — HP
[facility] MD Rehab Assess/Revision





- Admission History


Admitted to Rehab from: [facility]


Date of Admission to Rehab: 4/2/18





- Findings


Detox History & Physical reviewed: Yes


Concur with findings: Yes





Inpatient Rehab Admission





- Initial Determination


Are CD services needed?: Yes


Free of communicable disease: Yes


Not in need of hospitalization: Yes





- Rehab Admission Criteria


Previous failed treatment: Yes


Poor recovery environment: Yes


Comorbidities: Yes


Lacks judgement: Yes


Patient is meeting Inpatient Rehab admission criteria:: Yes

## 2018-04-03 RX ADMIN — Medication SCH MG: at 21:35

## 2018-04-03 RX ADMIN — HYDROXYZINE PAMOATE PRN MG: 50 CAPSULE ORAL at 21:34

## 2018-04-03 RX ADMIN — Medication PRN MG: at 21:34

## 2018-04-03 RX ADMIN — Medication SCH: at 10:40

## 2018-04-03 RX ADMIN — GABAPENTIN SCH MG: 100 CAPSULE ORAL at 21:34

## 2018-04-03 RX ADMIN — BUPRENORPHINE HYDROCHLORIDE, NALOXONE HYDROCHLORIDE SCH EACH: 2; .5 FILM, SOLUBLE BUCCAL; SUBLINGUAL at 15:08

## 2018-04-03 RX ADMIN — DOXEPIN HYDROCHLORIDE SCH MG: 25 CAPSULE ORAL at 22:43

## 2018-04-03 RX ADMIN — GABAPENTIN SCH: 100 CAPSULE ORAL at 13:56

## 2018-04-03 NOTE — PN
[facility] Progress Note


Note: 





Patient c/o of protracted withdrawal symptoms after completing detox. Patient c/

o of sweats, interrupted sleep, body aches and anxious.

















 Vital Signs











Temperature  97.9 F   04/03/18 08:00


 


Pulse Rate  62   04/03/18 08:00


 


Respiratory Rate  18   04/03/18 08:00


 


Blood Pressure  115/74   04/03/18 08:00


 


O2 Sat by Pulse Oximetry (%)      








Others' Prescriptions











Patient Name: Walter Shay YOB: 1970


 


Address: [address] [place] [id number] Sex: Male














Rx Written Rx Dispensed Drug Quantity Days Supply Prescriber Name  


 


02/24/2018 03/05/2018 suboxone 8 mg-2 mg sl film  60 30 Britni Christy MD  














Patient Name: Walter Shay YOB: 1970


 


Address: [address] [place] [address] Sex: Male














Rx Written Rx Dispensed Drug Quantity Days Supply Prescriber Name  


 


02/05/2018 02/05/2018 suboxone 8 mg-2 mg sl film  60 30 Miguelito Turner ([facility]

-[facility])  


 


02/02/2018 02/02/2018 suboxone 8 mg-2 mg sl film  3 1 Laks, Tacho SOUZA  


 


01/25/2018 01/25/2018 suboxone 8 mg-2 mg sl film  9 3 Enriqueta Reese MD  


 


01/16/2018 01/16/2018 suboxone 8 mg-2 mg sl film  9 3 Laks, Tacho SOUZA  


 


01/11/2018 01/11/2018 suboxone 8 mg-2 mg sl film  9 3 Laks, Tacho SOUZA  


 


01/11/2018 01/11/2018 chlordiazepoxide 10 mg capsule  18 2 Laks, Tacho SOUZA  














A / P 


Patient evaluated at the bedside, in no apparent distress, very irritable, mild 

diaphoresis present 


Normal HR and Rhythm 


No adventitious breath sounds 


Full ROM ambulating 





Plan:





Start suboxone to today 2mg QD, dose will be titrate base on patient response 


Increase fluids 


Continue to monitor

## 2018-04-03 NOTE — HP
Psychiatrist Admission





- Data


Date of interview: 04/03/18


Admission source: 6N


Identifying data: This is the first 5N inpatient rehabilitation admission for 

this 47 year old   who is single, without children, currently homeless 

and unemployed and deprived of income.


Medical History: Hep C, withdrawal seizures, smokes cigarettes 10 a day.


Psychiatric History: Patient denies history of psychiatric treatment, states 

while in [place] treated for insomnia with trazodone, but does not like it due 

to the side-effects and gabapentin for anxiety, patient is very irritable 

stating  "I need my suboxone, I am leaving if I don't get it today", reports 

feeling very anxious and poor sleep, lost 30 lbs over one months, attributes it 

to his heavy drug use.


Physical/Sexual Abuse/Trauma History: denies history of sexual, physical and 

verbal abuse.


Vital Signs: 


 Vital Signs - 24 hr











  04/03/18 04/03/18





  03:30 08:00


 


Temperature  97.9 F


 


Pulse Rate  62


 


Respiratory 18 18





Rate  


 


Blood Pressure  115/74











Allergies/Adverse Reactions: 


 Allergies











Allergy/AdvReac Type Severity Reaction Status Date / Time


 


carbamazepine [From Tegretol] Allergy Intermediate Swelling Verified 04/02/18 17

:35


 


levetiracetam [From Keppra] Allergy Intermediate Swelling Verified 04/02/18 17:

35


 


phenobarbital Allergy Intermediate Swelling Verified 04/02/18 17:35


 


phenytoin sodium Allergy Intermediate Swelling Verified 04/02/18 17:35





[From Dilantin]     


 


phenytoin sodium extended Allergy Intermediate Swelling Verified 04/02/18 17:35





[From Dilantin]     











Date of last physical exam: 03/30/18


Concur with the findings of this exam: Yes





- Substance Abuse/Tx History


Hx Alcohol Use: Yes (2 pints adily,)


Hx Substance Use: Yes


Substance Use Type: Cocaine ($20 daily), Heroin (injecting 1-2 bundles daily.)


Hx Substance Use Treatment: Yes ([facility] 2 montha ago)





Mental Status Exam





- Mental Status Exam


Alert and Oriented to: Place, Person


Cognitive Function: Grossly Intact


Patient Appearance: Unkempt, Disheveled


Mood: Angry, Anxious, Irritable


Affect: Mood Congruent


Patient Behavior: Cooperative


Speech Pattern: Appropriate


Voice Loudness: Normal


Thought Process: Goal Oriented


Thought Disorder: Not Present


Hallucinations: Denies


Suicidal Ideation: Denies


Homicidal Ideation: Denies


Insight/Judgement: Fair


Sleep: Difficulty falling asleep


Appetite: Poor, Weight loss (30 lbs over a month)


Muscle strength/Tone: Normal


Gait/Station: Normal





Psychiatric Findings





- Problem List (Axis 1, 2,3)


(1) Opioid dependence


Current Visit: Yes   Status: Acute   





(2) Alcohol dependence


Current Visit: Yes   Status: Acute   





(3) Nicotine dependence


Current Visit: Yes   Status: Acute   





(4) Substance induced mood disorder


Current Visit: No   Status: Acute   





- Initial Treatment Plan


Initial Treatment Plan: Will restart Gabapentin 100 mg po tid, add Sinequan 25 

mg po hs for insomnia, side-effects and benefits discussed with the patient, 

will monitor progress as needed.

## 2018-04-04 RX ADMIN — GABAPENTIN SCH MG: 100 CAPSULE ORAL at 21:44

## 2018-04-04 RX ADMIN — NICOTINE POLACRILEX PRN MG: 2 GUM, CHEWING ORAL at 13:02

## 2018-04-04 RX ADMIN — GABAPENTIN SCH MG: 100 CAPSULE ORAL at 13:02

## 2018-04-04 RX ADMIN — BUPRENORPHINE HYDROCHLORIDE, NALOXONE HYDROCHLORIDE SCH EACH: 2; .5 FILM, SOLUBLE BUCCAL; SUBLINGUAL at 10:46

## 2018-04-04 RX ADMIN — Medication SCH TAB: at 10:46

## 2018-04-04 RX ADMIN — GABAPENTIN SCH MG: 100 CAPSULE ORAL at 06:21

## 2018-04-04 RX ADMIN — NICOTINE POLACRILEX PRN MG: 2 GUM, CHEWING ORAL at 21:44

## 2018-04-04 RX ADMIN — DOXEPIN HYDROCHLORIDE SCH MG: 25 CAPSULE ORAL at 21:44

## 2018-04-04 RX ADMIN — Medication SCH MG: at 21:44

## 2018-04-04 NOTE — PN
BHS Progress Note


Note: 





Patient requesting increase in suboxone for withdrawal symptoms. Was started on 

suboxone yesterday 4/3/18 2mg. To reevaluate after 48 hours, has only been 24 

hours. Will continue to monitor clinically.

## 2018-04-05 RX ADMIN — DOXEPIN HYDROCHLORIDE SCH MG: 25 CAPSULE ORAL at 21:58

## 2018-04-05 RX ADMIN — NICOTINE POLACRILEX PRN MG: 2 GUM, CHEWING ORAL at 11:29

## 2018-04-05 RX ADMIN — GABAPENTIN SCH MG: 100 CAPSULE ORAL at 13:56

## 2018-04-05 RX ADMIN — NICOTINE POLACRILEX PRN MG: 2 GUM, CHEWING ORAL at 18:16

## 2018-04-05 RX ADMIN — NAPROXEN SCH MG: 500 TABLET ORAL at 21:58

## 2018-04-05 RX ADMIN — GABAPENTIN SCH MG: 100 CAPSULE ORAL at 06:38

## 2018-04-05 RX ADMIN — CYCLOBENZAPRINE HYDROCHLORIDE SCH MG: 10 TABLET, FILM COATED ORAL at 21:58

## 2018-04-05 RX ADMIN — Medication SCH MG: at 21:58

## 2018-04-05 RX ADMIN — NAPROXEN SCH MG: 500 TABLET ORAL at 11:53

## 2018-04-05 RX ADMIN — GABAPENTIN SCH MG: 100 CAPSULE ORAL at 21:58

## 2018-04-05 RX ADMIN — PANTOPRAZOLE SODIUM SCH MG: 40 TABLET, DELAYED RELEASE ORAL at 11:54

## 2018-04-05 RX ADMIN — CYCLOBENZAPRINE HYDROCHLORIDE SCH MG: 10 TABLET, FILM COATED ORAL at 13:57

## 2018-04-05 RX ADMIN — Medication SCH TAB: at 10:44

## 2018-04-05 NOTE — PN
BHS Progress Note (SOAP)


Subjective: 





c/o protractedd opioid withdrwal wants to increaawe MAT dose of suboxone


Objective: 





04/05/18 11:45


 Vital Signs - 24 hr











  04/05/18 04/05/18 04/05/18





  00:30 03:30 06:15


 


Temperature   97.8 F


 


Pulse Rate   64


 


Respiratory 18 16 18





Rate   


 


Blood Pressure   140/80








labs reveiwed, 


Assessment: 





04/05/18 11:46


increase suboxone to 8mg daily additional 4mg today. symptomatic relief of 

withdrawal w flexeril naproxen

## 2018-04-06 RX ADMIN — CYCLOBENZAPRINE HYDROCHLORIDE SCH MG: 10 TABLET, FILM COATED ORAL at 14:18

## 2018-04-06 RX ADMIN — CYCLOBENZAPRINE HYDROCHLORIDE SCH MG: 10 TABLET, FILM COATED ORAL at 06:24

## 2018-04-06 RX ADMIN — NAPROXEN SCH MG: 500 TABLET ORAL at 10:28

## 2018-04-06 RX ADMIN — BUPRENORPHINE HYDROCHLORIDE, NALOXONE HYDROCHLORIDE SCH EACH: 8; 2 FILM, SOLUBLE BUCCAL; SUBLINGUAL at 10:28

## 2018-04-06 RX ADMIN — DOXEPIN HYDROCHLORIDE SCH MG: 25 CAPSULE ORAL at 21:38

## 2018-04-06 RX ADMIN — GABAPENTIN SCH MG: 100 CAPSULE ORAL at 21:38

## 2018-04-06 RX ADMIN — NICOTINE POLACRILEX PRN MG: 2 GUM, CHEWING ORAL at 08:53

## 2018-04-06 RX ADMIN — NAPROXEN SCH MG: 500 TABLET ORAL at 21:38

## 2018-04-06 RX ADMIN — PANTOPRAZOLE SODIUM SCH MG: 40 TABLET, DELAYED RELEASE ORAL at 10:28

## 2018-04-06 RX ADMIN — CYCLOBENZAPRINE HYDROCHLORIDE SCH MG: 10 TABLET, FILM COATED ORAL at 21:38

## 2018-04-06 RX ADMIN — PHENOL PRN EACH: 14.5 LOZENGE ORAL at 21:38

## 2018-04-06 RX ADMIN — Medication SCH TAB: at 10:28

## 2018-04-06 RX ADMIN — Medication SCH MG: at 21:38

## 2018-04-06 RX ADMIN — NICOTINE POLACRILEX PRN MG: 2 GUM, CHEWING ORAL at 12:29

## 2018-04-06 RX ADMIN — GABAPENTIN SCH MG: 100 CAPSULE ORAL at 14:18

## 2018-04-06 RX ADMIN — GABAPENTIN SCH MG: 100 CAPSULE ORAL at 06:24

## 2018-04-07 RX ADMIN — NAPROXEN SCH MG: 500 TABLET ORAL at 10:35

## 2018-04-07 RX ADMIN — BUPRENORPHINE HYDROCHLORIDE, NALOXONE HYDROCHLORIDE SCH EACH: 8; 2 FILM, SOLUBLE BUCCAL; SUBLINGUAL at 10:35

## 2018-04-07 RX ADMIN — GABAPENTIN SCH MG: 100 CAPSULE ORAL at 06:17

## 2018-04-07 RX ADMIN — GABAPENTIN SCH MG: 100 CAPSULE ORAL at 14:10

## 2018-04-07 RX ADMIN — Medication SCH TAB: at 10:35

## 2018-04-07 RX ADMIN — CYCLOBENZAPRINE HYDROCHLORIDE SCH MG: 10 TABLET, FILM COATED ORAL at 06:17

## 2018-04-07 RX ADMIN — CYCLOBENZAPRINE HYDROCHLORIDE SCH MG: 10 TABLET, FILM COATED ORAL at 21:50

## 2018-04-07 RX ADMIN — CYCLOBENZAPRINE HYDROCHLORIDE SCH MG: 10 TABLET, FILM COATED ORAL at 14:10

## 2018-04-07 RX ADMIN — NICOTINE POLACRILEX PRN MG: 2 GUM, CHEWING ORAL at 12:52

## 2018-04-07 RX ADMIN — PANTOPRAZOLE SODIUM SCH MG: 40 TABLET, DELAYED RELEASE ORAL at 10:35

## 2018-04-07 RX ADMIN — PHENOL PRN EACH: 14.5 LOZENGE ORAL at 10:36

## 2018-04-07 RX ADMIN — NAPROXEN SCH MG: 500 TABLET ORAL at 21:50

## 2018-04-07 RX ADMIN — GABAPENTIN SCH MG: 100 CAPSULE ORAL at 21:50

## 2018-04-07 RX ADMIN — Medication SCH MG: at 21:50

## 2018-04-07 RX ADMIN — PHENOL PRN EACH: 14.5 LOZENGE ORAL at 21:51

## 2018-04-07 RX ADMIN — DOXEPIN HYDROCHLORIDE SCH MG: 25 CAPSULE ORAL at 21:50

## 2018-04-08 RX ADMIN — PANTOPRAZOLE SODIUM SCH MG: 40 TABLET, DELAYED RELEASE ORAL at 10:13

## 2018-04-08 RX ADMIN — Medication SCH TAB: at 10:13

## 2018-04-08 RX ADMIN — NICOTINE POLACRILEX PRN MG: 2 GUM, CHEWING ORAL at 14:12

## 2018-04-08 RX ADMIN — GABAPENTIN SCH MG: 100 CAPSULE ORAL at 06:14

## 2018-04-08 RX ADMIN — NAPROXEN SCH MG: 500 TABLET ORAL at 21:59

## 2018-04-08 RX ADMIN — Medication SCH MG: at 21:59

## 2018-04-08 RX ADMIN — CYCLOBENZAPRINE HYDROCHLORIDE SCH MG: 10 TABLET, FILM COATED ORAL at 14:12

## 2018-04-08 RX ADMIN — NICOTINE POLACRILEX PRN MG: 2 GUM, CHEWING ORAL at 06:17

## 2018-04-08 RX ADMIN — BUPRENORPHINE HYDROCHLORIDE, NALOXONE HYDROCHLORIDE SCH EACH: 8; 2 FILM, SOLUBLE BUCCAL; SUBLINGUAL at 10:13

## 2018-04-08 RX ADMIN — GABAPENTIN SCH MG: 100 CAPSULE ORAL at 21:59

## 2018-04-08 RX ADMIN — GABAPENTIN SCH MG: 100 CAPSULE ORAL at 14:12

## 2018-04-08 RX ADMIN — DOXEPIN HYDROCHLORIDE SCH MG: 25 CAPSULE ORAL at 21:59

## 2018-04-08 RX ADMIN — NAPROXEN SCH MG: 500 TABLET ORAL at 10:13

## 2018-04-08 RX ADMIN — PHENOL PRN EACH: 14.5 LOZENGE ORAL at 10:15

## 2018-04-08 RX ADMIN — CYCLOBENZAPRINE HYDROCHLORIDE SCH MG: 10 TABLET, FILM COATED ORAL at 06:14

## 2018-04-08 RX ADMIN — NICOTINE POLACRILEX PRN MG: 2 GUM, CHEWING ORAL at 10:15

## 2018-04-08 RX ADMIN — CYCLOBENZAPRINE HYDROCHLORIDE SCH MG: 10 TABLET, FILM COATED ORAL at 21:59

## 2018-04-09 RX ADMIN — NICOTINE POLACRILEX PRN MG: 2 GUM, CHEWING ORAL at 14:06

## 2018-04-09 RX ADMIN — CYCLOBENZAPRINE HYDROCHLORIDE SCH MG: 10 TABLET, FILM COATED ORAL at 14:05

## 2018-04-09 RX ADMIN — NICOTINE POLACRILEX PRN MG: 2 GUM, CHEWING ORAL at 21:32

## 2018-04-09 RX ADMIN — CYCLOBENZAPRINE HYDROCHLORIDE SCH MG: 10 TABLET, FILM COATED ORAL at 06:00

## 2018-04-09 RX ADMIN — GABAPENTIN SCH MG: 100 CAPSULE ORAL at 14:05

## 2018-04-09 RX ADMIN — CYCLOBENZAPRINE HYDROCHLORIDE SCH MG: 10 TABLET, FILM COATED ORAL at 21:31

## 2018-04-09 RX ADMIN — PHENOL PRN EACH: 14.5 LOZENGE ORAL at 14:06

## 2018-04-09 RX ADMIN — DOXEPIN HYDROCHLORIDE SCH MG: 25 CAPSULE ORAL at 21:31

## 2018-04-09 RX ADMIN — BUPRENORPHINE HYDROCHLORIDE, NALOXONE HYDROCHLORIDE SCH EACH: 8; 2 FILM, SOLUBLE BUCCAL; SUBLINGUAL at 10:58

## 2018-04-09 RX ADMIN — NICOTINE POLACRILEX PRN MG: 2 GUM, CHEWING ORAL at 10:58

## 2018-04-09 RX ADMIN — GABAPENTIN SCH MG: 100 CAPSULE ORAL at 21:31

## 2018-04-09 RX ADMIN — GABAPENTIN SCH MG: 100 CAPSULE ORAL at 06:00

## 2018-04-09 RX ADMIN — Medication SCH TAB: at 10:58

## 2018-04-09 RX ADMIN — NAPROXEN SCH MG: 500 TABLET ORAL at 21:31

## 2018-04-09 RX ADMIN — NAPROXEN SCH MG: 500 TABLET ORAL at 10:58

## 2018-04-09 RX ADMIN — Medication SCH MG: at 21:31

## 2018-04-09 RX ADMIN — PANTOPRAZOLE SODIUM SCH MG: 40 TABLET, DELAYED RELEASE ORAL at 10:58

## 2018-04-09 NOTE — PN
[facility]HS Progress Note


Note: 





Patient presents with anxiety, irritability and intermittent sweating. States 

suboxone helps with symptoms however at dose of 16mg daily as he was being 

given this dose as outpatient. 


 Vital Signs











 Period  Temp  Pulse  Resp  BP Sys/Knight  Pulse Ox


 


 Last 24 Hr  98.6 F  65  16-18  140/81  








Obj:





Skin: warm and dry


Car: S1S2, RRR


Resp: CTA BL


Gen: alert and oriented x 3. Anxious and pacing in unit


 





A/P: Withdrawal syndrome





Suboxone verified in ISTOP reference number [id number]


Will increase to 16mg daily


continue to monitor clinically

## 2018-04-10 RX ADMIN — CYCLOBENZAPRINE HYDROCHLORIDE SCH MG: 10 TABLET, FILM COATED ORAL at 14:18

## 2018-04-10 RX ADMIN — NICOTINE POLACRILEX PRN MG: 2 GUM, CHEWING ORAL at 12:31

## 2018-04-10 RX ADMIN — CYCLOBENZAPRINE HYDROCHLORIDE SCH MG: 10 TABLET, FILM COATED ORAL at 21:23

## 2018-04-10 RX ADMIN — PHENOL PRN EACH: 14.5 LOZENGE ORAL at 12:31

## 2018-04-10 RX ADMIN — GABAPENTIN SCH MG: 100 CAPSULE ORAL at 21:23

## 2018-04-10 RX ADMIN — NAPROXEN SCH MG: 500 TABLET ORAL at 10:21

## 2018-04-10 RX ADMIN — CYCLOBENZAPRINE HYDROCHLORIDE SCH MG: 10 TABLET, FILM COATED ORAL at 06:28

## 2018-04-10 RX ADMIN — NAPROXEN SCH MG: 500 TABLET ORAL at 21:23

## 2018-04-10 RX ADMIN — Medication SCH TAB: at 10:21

## 2018-04-10 RX ADMIN — Medication SCH MG: at 21:23

## 2018-04-10 RX ADMIN — NICOTINE POLACRILEX PRN MG: 2 GUM, CHEWING ORAL at 20:36

## 2018-04-10 RX ADMIN — GABAPENTIN SCH MG: 100 CAPSULE ORAL at 06:28

## 2018-04-10 RX ADMIN — BUPRENORPHINE HYDROCHLORIDE, NALOXONE HYDROCHLORIDE SCH EACH: 8; 2 FILM, SOLUBLE BUCCAL; SUBLINGUAL at 10:21

## 2018-04-10 RX ADMIN — GABAPENTIN SCH MG: 100 CAPSULE ORAL at 14:18

## 2018-04-10 RX ADMIN — PANTOPRAZOLE SODIUM SCH MG: 40 TABLET, DELAYED RELEASE ORAL at 10:21

## 2018-04-10 RX ADMIN — DOXEPIN HYDROCHLORIDE SCH MG: 25 CAPSULE ORAL at 21:23

## 2018-04-11 RX ADMIN — NICOTINE POLACRILEX PRN MG: 2 GUM, CHEWING ORAL at 06:21

## 2018-04-11 RX ADMIN — DOXEPIN HYDROCHLORIDE SCH MG: 25 CAPSULE ORAL at 22:03

## 2018-04-11 RX ADMIN — NAPROXEN SCH MG: 500 TABLET ORAL at 22:03

## 2018-04-11 RX ADMIN — CYCLOBENZAPRINE HYDROCHLORIDE SCH MG: 10 TABLET, FILM COATED ORAL at 14:10

## 2018-04-11 RX ADMIN — PANTOPRAZOLE SODIUM SCH MG: 40 TABLET, DELAYED RELEASE ORAL at 10:20

## 2018-04-11 RX ADMIN — NAPROXEN SCH MG: 500 TABLET ORAL at 10:20

## 2018-04-11 RX ADMIN — GABAPENTIN SCH MG: 100 CAPSULE ORAL at 06:20

## 2018-04-11 RX ADMIN — GABAPENTIN SCH MG: 100 CAPSULE ORAL at 14:10

## 2018-04-11 RX ADMIN — BUPRENORPHINE HYDROCHLORIDE, NALOXONE HYDROCHLORIDE SCH EACH: 8; 2 FILM, SOLUBLE BUCCAL; SUBLINGUAL at 10:20

## 2018-04-11 RX ADMIN — Medication SCH TAB: at 10:20

## 2018-04-11 RX ADMIN — Medication SCH MG: at 22:03

## 2018-04-11 RX ADMIN — NICOTINE POLACRILEX PRN MG: 2 GUM, CHEWING ORAL at 12:46

## 2018-04-11 RX ADMIN — GABAPENTIN SCH MG: 100 CAPSULE ORAL at 22:03

## 2018-04-11 RX ADMIN — CYCLOBENZAPRINE HYDROCHLORIDE SCH MG: 10 TABLET, FILM COATED ORAL at 06:21

## 2018-04-11 RX ADMIN — CYCLOBENZAPRINE HYDROCHLORIDE SCH MG: 10 TABLET, FILM COATED ORAL at 22:03

## 2018-04-12 RX ADMIN — BUPRENORPHINE HYDROCHLORIDE, NALOXONE HYDROCHLORIDE SCH EACH: 8; 2 FILM, SOLUBLE BUCCAL; SUBLINGUAL at 10:19

## 2018-04-12 RX ADMIN — NAPROXEN SCH MG: 500 TABLET ORAL at 10:19

## 2018-04-12 RX ADMIN — PANTOPRAZOLE SODIUM SCH MG: 40 TABLET, DELAYED RELEASE ORAL at 10:19

## 2018-04-12 RX ADMIN — DOXEPIN HYDROCHLORIDE SCH MG: 25 CAPSULE ORAL at 21:49

## 2018-04-12 RX ADMIN — GABAPENTIN SCH MG: 100 CAPSULE ORAL at 06:22

## 2018-04-12 RX ADMIN — NAPROXEN SCH MG: 500 TABLET ORAL at 21:49

## 2018-04-12 RX ADMIN — CYCLOBENZAPRINE HYDROCHLORIDE SCH MG: 10 TABLET, FILM COATED ORAL at 21:49

## 2018-04-12 RX ADMIN — CYCLOBENZAPRINE HYDROCHLORIDE SCH MG: 10 TABLET, FILM COATED ORAL at 13:58

## 2018-04-12 RX ADMIN — Medication SCH MG: at 21:49

## 2018-04-12 RX ADMIN — Medication SCH TAB: at 10:19

## 2018-04-12 RX ADMIN — GABAPENTIN SCH MG: 100 CAPSULE ORAL at 21:49

## 2018-04-12 RX ADMIN — GABAPENTIN SCH MG: 100 CAPSULE ORAL at 13:59

## 2018-04-12 RX ADMIN — CYCLOBENZAPRINE HYDROCHLORIDE SCH MG: 10 TABLET, FILM COATED ORAL at 06:22

## 2018-04-12 NOTE — PN
BHS Progress Note (SOAP)


Subjective: 





ernien reports no cravinghs or desire to use on current suboxoen dose of 16mg 

daily.


Objective: 





04/12/18 14:40


 Vital Signs - 24 hr











  04/12/18 04/12/18 04/12/18





  00:30 03:30 07:14


 


Temperature   98.3 F


 


Pulse Rate   80


 


Respiratory 16 18 16





Rate   


 


Blood Pressure   135/86








labs reviewed. 


Assessment: 





04/12/18 14:42


oud, severe, cont mat with suboxone 16mg daily will give 30 day prescription 

for rehab.

## 2018-04-13 RX ADMIN — Medication SCH: at 21:46

## 2018-04-13 RX ADMIN — GABAPENTIN SCH MG: 100 CAPSULE ORAL at 06:08

## 2018-04-13 RX ADMIN — Medication SCH TAB: at 10:33

## 2018-04-13 RX ADMIN — PANTOPRAZOLE SODIUM SCH MG: 40 TABLET, DELAYED RELEASE ORAL at 10:33

## 2018-04-13 RX ADMIN — DOXEPIN HYDROCHLORIDE SCH MG: 25 CAPSULE ORAL at 21:46

## 2018-04-13 RX ADMIN — CYCLOBENZAPRINE HYDROCHLORIDE SCH MG: 10 TABLET, FILM COATED ORAL at 06:08

## 2018-04-13 RX ADMIN — NAPROXEN SCH MG: 500 TABLET ORAL at 21:46

## 2018-04-13 RX ADMIN — GABAPENTIN SCH MG: 100 CAPSULE ORAL at 21:46

## 2018-04-13 RX ADMIN — BUPRENORPHINE HYDROCHLORIDE, NALOXONE HYDROCHLORIDE SCH EACH: 8; 2 FILM, SOLUBLE BUCCAL; SUBLINGUAL at 10:34

## 2018-04-13 RX ADMIN — NAPROXEN SCH MG: 500 TABLET ORAL at 10:33

## 2018-04-13 RX ADMIN — GABAPENTIN SCH MG: 100 CAPSULE ORAL at 14:27

## 2018-04-13 RX ADMIN — CYCLOBENZAPRINE HYDROCHLORIDE SCH MG: 10 TABLET, FILM COATED ORAL at 14:27

## 2018-04-13 RX ADMIN — NICOTINE POLACRILEX PRN MG: 2 GUM, CHEWING ORAL at 10:46

## 2018-04-13 RX ADMIN — CYCLOBENZAPRINE HYDROCHLORIDE SCH MG: 10 TABLET, FILM COATED ORAL at 21:46

## 2018-04-14 RX ADMIN — GABAPENTIN SCH MG: 100 CAPSULE ORAL at 06:19

## 2018-04-14 RX ADMIN — PANTOPRAZOLE SODIUM SCH MG: 40 TABLET, DELAYED RELEASE ORAL at 10:00

## 2018-04-14 RX ADMIN — NAPROXEN SCH MG: 500 TABLET ORAL at 21:32

## 2018-04-14 RX ADMIN — CYCLOBENZAPRINE HYDROCHLORIDE SCH MG: 10 TABLET, FILM COATED ORAL at 06:19

## 2018-04-14 RX ADMIN — Medication SCH: at 21:33

## 2018-04-14 RX ADMIN — NAPROXEN SCH MG: 500 TABLET ORAL at 10:00

## 2018-04-14 RX ADMIN — Medication SCH TAB: at 09:59

## 2018-04-14 RX ADMIN — DOXEPIN HYDROCHLORIDE SCH MG: 25 CAPSULE ORAL at 21:32

## 2018-04-14 RX ADMIN — BUPRENORPHINE HYDROCHLORIDE, NALOXONE HYDROCHLORIDE SCH EACH: 8; 2 FILM, SOLUBLE BUCCAL; SUBLINGUAL at 10:00

## 2018-04-14 RX ADMIN — GABAPENTIN SCH MG: 100 CAPSULE ORAL at 21:32

## 2018-04-14 RX ADMIN — NICOTINE POLACRILEX PRN MG: 2 GUM, CHEWING ORAL at 10:49

## 2018-04-14 RX ADMIN — CYCLOBENZAPRINE HYDROCHLORIDE SCH MG: 10 TABLET, FILM COATED ORAL at 13:43

## 2018-04-14 RX ADMIN — CYCLOBENZAPRINE HYDROCHLORIDE SCH MG: 10 TABLET, FILM COATED ORAL at 21:32

## 2018-04-14 RX ADMIN — GABAPENTIN SCH MG: 100 CAPSULE ORAL at 13:43

## 2018-04-15 RX ADMIN — GABAPENTIN SCH MG: 100 CAPSULE ORAL at 13:55

## 2018-04-15 RX ADMIN — Medication SCH MG: at 21:33

## 2018-04-15 RX ADMIN — GABAPENTIN SCH MG: 100 CAPSULE ORAL at 06:17

## 2018-04-15 RX ADMIN — NAPROXEN SCH MG: 500 TABLET ORAL at 21:33

## 2018-04-15 RX ADMIN — NAPROXEN SCH MG: 500 TABLET ORAL at 10:19

## 2018-04-15 RX ADMIN — BUPRENORPHINE HYDROCHLORIDE, NALOXONE HYDROCHLORIDE SCH EACH: 8; 2 FILM, SOLUBLE BUCCAL; SUBLINGUAL at 10:19

## 2018-04-15 RX ADMIN — DOXEPIN HYDROCHLORIDE SCH MG: 25 CAPSULE ORAL at 21:33

## 2018-04-15 RX ADMIN — GABAPENTIN SCH MG: 100 CAPSULE ORAL at 21:33

## 2018-04-15 RX ADMIN — CYCLOBENZAPRINE HYDROCHLORIDE SCH MG: 10 TABLET, FILM COATED ORAL at 06:17

## 2018-04-15 RX ADMIN — PANTOPRAZOLE SODIUM SCH MG: 40 TABLET, DELAYED RELEASE ORAL at 10:19

## 2018-04-15 RX ADMIN — CYCLOBENZAPRINE HYDROCHLORIDE SCH MG: 10 TABLET, FILM COATED ORAL at 21:33

## 2018-04-15 RX ADMIN — NICOTINE POLACRILEX PRN MG: 2 GUM, CHEWING ORAL at 06:17

## 2018-04-15 RX ADMIN — Medication SCH TAB: at 10:19

## 2018-04-15 RX ADMIN — CYCLOBENZAPRINE HYDROCHLORIDE SCH MG: 10 TABLET, FILM COATED ORAL at 13:55

## 2018-04-15 RX ADMIN — NICOTINE POLACRILEX PRN MG: 2 GUM, CHEWING ORAL at 13:55

## 2018-04-16 VITALS — TEMPERATURE: 98 F | HEART RATE: 85 BPM | SYSTOLIC BLOOD PRESSURE: 143 MMHG | DIASTOLIC BLOOD PRESSURE: 77 MMHG

## 2018-04-16 RX ADMIN — CYCLOBENZAPRINE HYDROCHLORIDE SCH MG: 10 TABLET, FILM COATED ORAL at 06:16

## 2018-04-16 RX ADMIN — Medication SCH TAB: at 10:18

## 2018-04-16 RX ADMIN — NAPROXEN SCH MG: 500 TABLET ORAL at 10:18

## 2018-04-16 RX ADMIN — GABAPENTIN SCH MG: 100 CAPSULE ORAL at 06:16

## 2018-04-16 RX ADMIN — NICOTINE POLACRILEX PRN MG: 2 GUM, CHEWING ORAL at 06:17

## 2018-04-16 RX ADMIN — PANTOPRAZOLE SODIUM SCH MG: 40 TABLET, DELAYED RELEASE ORAL at 10:18

## 2018-04-16 NOTE — PN
Psychiatric Progress Note


Vital Signs: 


 Vital Signs











 Period  Temp  Pulse  Resp  BP Sys/Knight  Pulse Ox


 


 Last 24 Hr  98.0 F  85  16-18  143/77  











Date of Session: 04/16/18


Chief Complaint:: discharge visit


HPI: Patient has addressed alcohol, opioid, nicotine dependence comorbid 

substance induced mood disorder.


ROS: Hep C, withdrawal seizures medically managed.


Current Medications: 


Active Medications











Generic Name Dose Route Start Last Admin





  Trade Name Freq  PRN Reason Stop Dose Admin


 


Acetaminophen  650 mg  04/02/18 18:18  





  Tylenol -  PO   





  Q4H PRN   





  FEVER   


 


Al Hydroxide/Mg Hydroxide  30 ml  04/02/18 18:18  





  Mylanta Oral Suspension -  PO   





  Q6H PRN   





  DYSPEPSIA   


 


Cyclobenzaprine HCl  10 mg  04/05/18 14:00  04/16/18 06:16





  Flexeril -  PO   10 mg





  TID MISTY   Administration


 


Doxepin HCl  25 mg  04/03/18 22:00  04/15/18 21:33





  Sinequan -  PO   25 mg





  HS MISTY   Administration


 


Eucalyptus/Menthol/Phenol/Sorbitol  1 each  04/02/18 18:18  04/10/18 12:31





  Cepastat Lozenge -  MM   1 each





  Q4H PRN   Administration





  SORE THROAT   


 


Gabapentin  100 mg  04/03/18 14:00  04/16/18 06:16





  Neurontin -  PO   100 mg





  TID MISTY   Administration


 


Guaifenesin  10 ml  04/02/18 18:18  





  Robitussin Dm -  PO   





  Q6H PRN   





  COUGH   


 


Hydroxyzine Pamoate  50 mg  04/02/18 18:18  04/03/18 21:34





  Vistaril -  PO   50 mg





  Q4H PRN   Administration





  AGITATION   


 


Loperamide HCl  4 mg  04/02/18 18:18  04/12/18 12:53





  Imodium -  PO   4 mg





  Q6H PRN   Administration





  DIARRHEA   


 


Magnesium Citrate  300 ml  04/02/18 18:18  





  Citroma -  PO   





  Q48H PRN   





  CONSTIPATION   


 


Magnesium Hydroxide  30 ml  04/02/18 18:18  





  Milk Of Magnesia -  PO   





  DAILY PRN   





  CONSTIPATION   


 


Melatonin  5 mg  04/02/18 22:00  04/03/18 21:34





  Melatonin  PO   5 mg





  HS PRN   Administration





  INSOMNIA   


 


Naproxen  500 mg  04/05/18 11:45  04/16/18 10:18





  Naprosyn -  PO   500 mg





  BID MISTY   Administration


 


Nicotine Polacrilex  2 mg  04/04/18 12:38  04/16/18 06:17





  Nicorette Gum -  BC   2 mg





  Q2H PRN   Administration





  NICOTINE REPLACEMENT RX   


 


Pantoprazole Sodium  40 mg  04/05/18 11:45  04/16/18 10:18





  Protonix -  PO   40 mg





  DAILY MISTY   Administration


 


Prenatal Multivit/Folic Acid/Iron  1 tab  04/03/18 10:00  04/16/18 10:18





  Prenatal Vitamins (Sjr) -  PO   1 tab





  DAILY MISTY   Administration


 


Pseudoephedrine/Triprolidine  1 combo  04/02/18 18:18  





  Actifed -  PO   





  TID PRN   





  NASAL CONGESTION   


 


Thiamine HCl  100 mg  04/02/18 22:00  04/15/18 21:33





  Vitamin B1 -  PO   100 mg





  HS MISTY   Administration











Current Side Effect: No


Lab tests ordered: No


Lab tests reviewed: Yes


Provider note:: patient has completed today this program and met his goals will 

continue to address his issues at [facility]. 

Patient focused on importance of changing attitudes to maintain sobriety, he 

gained insights into his addiction and motivated to continue maintain 

abstinence. Medication Gabapentin and Sinequan well tolerated, scripts provided 

for 30 days, patient is stable for discharge today.


Total face to face time:: 25





Mental Status Exam





- Mental Status Exam


Alert and Oriented to: Time, Place, Person


Cognitive Function: Good


Mood: Hopeful


Affect: Appropriate, Mood Congruent


Patient Behavior: Appropriate, Cooperative


Speech Pattern: Clear, Appropriate


Voice Loudness: Normal


Thought Process: Goal Oriented


Thought Disorder: Not Present


Hallucinations: Denies


Suicidal Ideation: Denies


Homicidal Ideation: Denies


Insight/Judgement: Fair


Sleep: Fair


Appetite: Fair


Muscle strength/Tone: Normal


Gait/Station: Normal





Psychiatric Treatment Plan





- Problem List


(1) Opioid dependence


Current Visit: Yes   


Qualifiers: 


   Substance use status: with opioid-induced sleep disorder   Qualified Code(s)

: F11.282 - Opioid dependence with opioid-induced sleep disorder   





(2) Alcohol dependence


Current Visit: Yes   





(3) Nicotine dependence


Current Visit: Yes   


Qualifiers: 


   Nicotine product type: cigarettes 





(4) Substance induced mood disorder


Current Visit: No

## 2018-04-30 ENCOUNTER — EMERGENCY (EMERGENCY)
Facility: HOSPITAL | Age: 48
LOS: 1 days | Discharge: HOME | End: 2018-04-30
Admitting: EMERGENCY MEDICINE
Payer: SELF-PAY

## 2018-04-30 VITALS
DIASTOLIC BLOOD PRESSURE: 119 MMHG | RESPIRATION RATE: 18 BRPM | TEMPERATURE: 98 F | HEART RATE: 87 BPM | OXYGEN SATURATION: 99 % | SYSTOLIC BLOOD PRESSURE: 167 MMHG

## 2018-04-30 DIAGNOSIS — F10.120 ALCOHOL ABUSE WITH INTOXICATION, UNCOMPLICATED: ICD-10-CM

## 2018-04-30 DIAGNOSIS — R41.82 ALTERED MENTAL STATUS, UNSPECIFIED: ICD-10-CM

## 2018-04-30 PROCEDURE — 99284 EMERGENCY DEPT VISIT MOD MDM: CPT

## 2018-04-30 NOTE — ED ADULT TRIAGE NOTE - CHIEF COMPLAINT QUOTE
pt presents altered. admits to opiod use. pt was also hypoglycemic upon medic arrival to scene. patient's blood sugar was 25, given 25 of D50 and sugar recheck was 300. patient also given 0.5 narcan en route and is now responsive.

## 2018-05-01 VITALS
TEMPERATURE: 98 F | RESPIRATION RATE: 16 BRPM | OXYGEN SATURATION: 97 % | DIASTOLIC BLOOD PRESSURE: 66 MMHG | HEART RATE: 72 BPM | SYSTOLIC BLOOD PRESSURE: 113 MMHG

## 2018-05-01 NOTE — ED PROVIDER NOTE - OBJECTIVE STATEMENT
47 year old male    pt presents altered. admits to opiod use. pt was also hypoglycemic upon medic arrival to scene. patient's blood sugar was 25, given 25 of D50 and sugar recheck was 300. patient also given 0.5 narcan en route and is now responsive. 47 year old male brought in by EMS admits to opoid use.  pt was also hypoglycemic upon medic arrival to scene. patient's blood sugar was 25, given 25 of D50 and sugar recheck was 300. patient also given 0.5 narcan en route and is more alert.  no obvious head trauma.

## 2018-05-01 NOTE — ED PROVIDER NOTE - PROGRESS NOTE DETAILS
The patient is now awake and alert, clinically sober.  Able to walk a straight line.  Repeat exam and neuro/cranial nerve exams normal.  No evidence of head/neck trauma.  Patient denies any pain or other complaints.  Denies cp/sob/ha/abd pain.  Abd soft, lungs clear, heart exam normal.  Chavo po challenge.  Patient says only used alcohol no other substances.  Denies any assault.  Feels much better and pt feels safe for discharge.  No evidence of intoxication at this time or alcohol withdrawal.  No other complaints on discharge.

## 2018-11-16 ENCOUNTER — HOSPITAL ENCOUNTER (INPATIENT)
Dept: HOSPITAL 74 - YASAS | Age: 48
LOS: 4 days | Discharge: OTHER INSTITUTION | DRG: 773 | End: 2018-11-20
Attending: INTERNAL MEDICINE | Admitting: INTERNAL MEDICINE
Payer: COMMERCIAL

## 2018-11-16 VITALS — BODY MASS INDEX: 24.1 KG/M2

## 2018-11-16 DIAGNOSIS — F17.213: ICD-10-CM

## 2018-11-16 DIAGNOSIS — F12.20: ICD-10-CM

## 2018-11-16 DIAGNOSIS — R56.9: ICD-10-CM

## 2018-11-16 DIAGNOSIS — E86.0: ICD-10-CM

## 2018-11-16 DIAGNOSIS — R63.4: ICD-10-CM

## 2018-11-16 DIAGNOSIS — L03.113: ICD-10-CM

## 2018-11-16 DIAGNOSIS — F10.230: ICD-10-CM

## 2018-11-16 DIAGNOSIS — G47.00: ICD-10-CM

## 2018-11-16 DIAGNOSIS — F19.24: ICD-10-CM

## 2018-11-16 DIAGNOSIS — F14.20: ICD-10-CM

## 2018-11-16 DIAGNOSIS — F11.23: Primary | ICD-10-CM

## 2018-11-16 DIAGNOSIS — Z91.19: ICD-10-CM

## 2018-11-16 LAB
APPEARANCE UR: (no result)
BILIRUB UR STRIP.AUTO-MCNC: NEGATIVE MG/DL
COLOR UR: YELLOW
KETONES UR QL STRIP: NEGATIVE
LEUKOCYTE ESTERASE UR QL STRIP.AUTO: NEGATIVE
NITRITE UR QL STRIP: NEGATIVE
PH UR: 5 [PH] (ref 5–8)
PROT UR QL STRIP: NEGATIVE
PROT UR QL STRIP: NEGATIVE
SP GR UR: 1.03 (ref 1.01–1.03)
UROBILINOGEN UR STRIP-MCNC: NEGATIVE MG/DL (ref 0.2–1)

## 2018-11-16 PROCEDURE — HZ2ZZZZ DETOXIFICATION SERVICES FOR SUBSTANCE ABUSE TREATMENT: ICD-10-PCS | Performed by: INTERNAL MEDICINE

## 2018-11-16 RX ADMIN — HYDROXYZINE PAMOATE PRN MG: 50 CAPSULE ORAL at 22:59

## 2018-11-16 RX ADMIN — BACITRACIN ZINC SCH GM: 500 OINTMENT TOPICAL at 12:37

## 2018-11-16 RX ADMIN — BACITRACIN ZINC SCH GM: 500 OINTMENT TOPICAL at 22:59

## 2018-11-16 RX ADMIN — SULFAMETHOXAZOLE AND TRIMETHOPRIM SCH EACH: 800; 160 TABLET ORAL at 12:37

## 2018-11-16 RX ADMIN — Medication SCH MG: at 22:59

## 2018-11-16 RX ADMIN — SULFAMETHOXAZOLE AND TRIMETHOPRIM SCH EACH: 800; 160 TABLET ORAL at 22:58

## 2018-11-16 RX ADMIN — NICOTINE SCH: 21 PATCH TRANSDERMAL at 12:37

## 2018-11-16 NOTE — HP
COWS





- Scale


Resting Pulse: 0= MO 80 or Below


Sweatin= Chills/Flushing


Restless Observation: 1= Difficult to Sit Still


Pupil Size: 1= Pupils >than Normal


Bone or Joint Aches: 2= Severe Diffuse Aches


Runny Nose/ Eye Tearin= Runny Nose/Eyes


GI Upset > 30mins: 2= Nausea/Diarrhea


Tremor Observation: 2= Slight Tremor Visible


Yawning Observation: 1= 1-2x During Session


Anxiety or Irritability: 2=Irritable/Anxious


Goose Flesh Skin: 0=Smooth Skin


COWS Score: 14





CIWA Score


Nausea/Vomitin


Muscle Tremors: 2


Anxiety: 2


Agitation: 2


Paroxysmal Sweats: 1-Minimal Palms Moist


Orientation: 0-Oriented


Tacttile Disturbances: 1-Very Mild Itch/Numbness


Auditory Disturbances: 1-Very Mild


Visual Disturbances: 0-None


Headache: 2-Mild


CIWA-Ar Total Score: 13





- Admission Criteria


[facility] Guidelines: Admission for Medically Managed Detox: 


Requires at least one of the followin. CIWA greater than 12


2. Seizures within the past 24 hours


3. Delirium tremens within the past 24 hours


4. Hallucinations within the past 24 hours


5. Acute intervention needed for co  occurring medical disorder


6. Acute intervention needed for co  occurring psychiatric disorder


7. Severe withdrawal that cannot be handled at a lower level of care (continued


    vomiting, continued diarrhea, abnormal vital signs) requiring intravenous


    medication and/or fluids


8. Pregnancy





Patient presents the following: CIWA greater than 12


Admission Criteria Met: Admission criteria met





Admission ROS [facility]





- [facility]


Chief Complaint: 





i need help to stop using heroin and alcohol,cocaine and marijuana


Allergies/Adverse Reactions: 


 Allergies











Allergy/AdvReac Type Severity Reaction Status Date / Time


 


Fish Containing Products Allergy Severe Hives Verified 18 11:06


 


fish derived Allergy Severe Hives Verified 18 11:06


 


carbamazepine [From Tegretol] Allergy Intermediate Swelling Verified 18 11

:06


 


levetiracetam [From Keppra] Allergy Intermediate Swelling Verified 18 11:

06


 


phenobarbital Allergy Intermediate Swelling Verified 18 11:06


 


phenytoin sodium Allergy Intermediate Swelling Verified 18 11:06





[From Dilantin]     


 


phenytoin sodium extended Allergy Intermediate Swelling Verified 18 11:06





[From Dilantin]     











History of Present Illness: 





this 48 years old male with heroin,alcohol,cocaine and marijuana dependence,

seeking detox,withdrawal symptom,last detox in  project


renewal,not completed


multiple admissions in the past


weight loss


hepatitis c


seizure last 2017


nicotine dependence


no significant period of sobriety


insomnia





Exam Limitations: No Limitations





- Ebola screening


Have you traveled outside of the country in the last 21 days: No


Have you had contact with anyone from an Ebola affected area: No


Have you been sick,other than usual withdrawal symptoms: No


Do you have a fever: No





- Review of Systems


Constitutional: Chills, Loss of Appetite, Malaise, Night Sweats, Changes in 

sleep, Weakness, Unintentional Wgt. Loss


EENT: reports: Nose Congestion


Respiratory: reports: No Symptoms reported


Cardiac: reports: No Symptoms Reported


GI: reports: Diarrhea, Nausea, Vomiting, Abdominal cramping


: reports: No Symptoms Reported


Musculoskeletal: reports: Back Pain, Muscle Pain, Neck Pain


Integumentary: reports: Dryness


Neuro: reports: Headache, Tremors


Endocrine: reports: No Symptoms Reported


Hematology: reports: No Symptoms Reported


Psychiatric: reports: No Sypmtoms Reported, Judgement Intact, Mood/Affect 

Appropiate, Orientated x3





Patient History





- Patient Medical History


Hx Anemia: No


Hx Asthma: No


Hx Chronic Obstructive Pulmonary Disease (COPD): No


Hx Cancer: No


Hx Cardiac Disorders: No


Hx Congestive Heart Failure: No


Hx Hypertension: No


Hx Hypercholesterolemia: No


Hx Pacemaker: No


HX Cerebrovascular Accident: No


Hx Seizures: Yes (last )


Hx Dementia: No


Hx Diabetes: No


Hx Gastrointestinal Disorders: No


Hx Liver Disease: No


Hx Genitourinary Disorders: No


Hx Sexually Transmitted Disorders: No


Hx Renal Disease (ESRD): No


Hx Thyroid Disease: No


Hx Human Immunodeficiency Virus (HIV): No (last )


Hx Hepatitis C: Yes (treatment)


Hx Depression: No


Hx Suicide Attempt: No


Hx Bipolar Disorder: No


Hx Schizophrenia: No


Other Medical History: no sucidal,no homicidal





- Patient Surgical History


Past Surgical History: No


Hx Neurologic Surgery: No


Hx Cataract Extraction: No


Hx Cardiac Surgery: No


Hx Lung Surgery: No


Hx Breast Surgery: No


Hx Breast Biopsy: No


Hx Abdominal Surgery: No


Hx Appendectomy: No


Hx Cholecystectomy: No


Hx Genitourinary Surgery: No


Hx  Section: No


Hx Orthopedic Surgery: No





- PPD History


Previous Implant?: Yes


Documented Results: Negative w/o proof


Implanted On Prior R Admission?: Yes


Date: 10/07/16


Results: 0 mm


PPD to be Administered?: Yes





- Smoking Cessation


Smoking history: Current every day smoker


Have you smoked in the past 12 months: Yes


Aproximately how many cigarettes per day: 10


Hx Chewing Tobacco Use: No


Initiated information on smoking cessation: Yes


'Breaking Loose' booklet given: 18





- Substance & Tx. History


Hx Alcohol Use: Yes


Hx Substance Use: Yes


Substance Use Type: Alcohol, Cocaine, Heroin, Marijuana


Hx Substance Use Treatment: Yes (project renewal  not completed)





- Substances Abused


  ** Heroin


Route: Injection


Frequency: Daily


Amount used: 10 BAGS


Age of first use: 17


Date of Last Use: 11/15/18





  ** Alcohol


Route: Oral


Frequency: Daily


Amount used: 2 PINTS VODKA


Age of first use: 15


Date of Last Use: 18





  ** Cocaine


Route: Injection


Frequency: Daily


Amount used: 1/2 GRAM


Age of first use: 19


Date of Last Use: 11/15/18





  ** Marijuana/Hashish


Route: Smoking


Frequency: Daily


Amount used: 2 BLUNTS


Age of first use: 12


Date of Last Use: 18





Family Disease History





- Family Disease History


Family Disease History: Other: Father (, [place]), Mother (, [place] )





Admission Physical Exam [facility]





- Vital Signs


Vital Signs: 


 Vital Signs - 24 hr











  18





  10:01


 


Temperature 97.1 F L


 


Pulse Rate 73


 


Respiratory 18





Rate 


 


Blood Pressure 127/70














- Physical


General Appearance: Yes: Moderate Distress, Tremorous, Irritable, Sweating, 

Anxious


HEENTM: Yes: Normal ENT Inspection, SEBAS, Pharynx Normal


Respiratory: Yes: Lungs Clear, Normal Breath Sounds, No Respiratory Distress


Neck: Yes: Within Normal Limits, Supple, Trachea in good position


Breast: Yes: Within Normal Limits


Cardiology: Yes: Within Normal Limits, Regular Rhythm, Regular Rate, S1, S2


Abdominal: Yes: Within Normal Limits, Normal Bowel Sounds, Non Tender, Flat, 

Soft


Genitourinary: Yes: Within Normal Limits


Back: Yes: Muscle Spasm


Musculoskeletal: Yes: Back pain, Muscle Pain


Extremities: Yes: Tremors


Neurological: Yes: CNs II-XII NML intact, Fully Oriented, Alert, Motor Strength 

5/5


Integumentary: Yes: Dry, Track Marks (cellulitis right forearm)


Lymphatic: Yes: Within Normal Limits





- Diagnostic


(1) Opioid dependence with withdrawal


Current Visit: Yes   Status: Acute   





(2) Alcohol dependence with uncomplicated withdrawal


Current Visit: Yes   Status: Acute   





(3) Cocaine dependence, uncomplicated


Current Visit: No   Status: Acute   





(4) Cannabis dependence


Current Visit: Yes   Status: Acute   





(5) Dehydration


Current Visit: No   Status: Acute   





(6) History of seizure


Current Visit: No   Status: Acute   





(7) IV drug user


Current Visit: No   Status: Acute   





(8) Weight loss


Current Visit: No   Status: Acute   





(9) Hepatitis C


Current Visit: No   Status: Chronic   


Qualifiers: 


   Viral hepatitis chronicity: chronic   Hepatic coma status: without hepatic 

coma   Qualified Code(s): B18.2 - Chronic viral hepatitis C   





(10) Cellulitis of right forearm


Current Visit: Yes   Status: Acute   





Cleared for Admission [facility]





- Detox or Rehab


[facility] Level of Care: Medically Managed


Detox Regimen/Protocol: Methadone/Librium





[facility] Breath Alcohol Content


Breath Alcohol Content: 0





Urine Drug Screen





- Results


Urine Drug Screen Results: THC-Marijuana, FELISA-Cocaine, OPI-Opiates, FEN-Fentanyl

## 2018-11-17 LAB
ALBUMIN SERPL-MCNC: 3.4 G/DL (ref 3.4–5)
ALP SERPL-CCNC: 71 U/L (ref 45–117)
ALT SERPL-CCNC: 29 U/L (ref 13–61)
ANION GAP SERPL CALC-SCNC: 6 MMOL/L (ref 8–16)
AST SERPL-CCNC: 26 U/L (ref 15–37)
BILIRUB SERPL-MCNC: 0.3 MG/DL (ref 0.2–1)
BUN SERPL-MCNC: 13 MG/DL (ref 7–18)
CALCIUM SERPL-MCNC: 9 MG/DL (ref 8.5–10.1)
CHLORIDE SERPL-SCNC: 104 MMOL/L (ref 98–107)
CO2 SERPL-SCNC: 29 MMOL/L (ref 21–32)
CREAT SERPL-MCNC: 0.8 MG/DL (ref 0.55–1.3)
DEPRECATED RDW RBC AUTO: 13.6 % (ref 11.9–15.9)
GLUCOSE SERPL-MCNC: 89 MG/DL (ref 74–106)
HCT VFR BLD CALC: 43.5 % (ref 35.4–49)
HGB BLD-MCNC: 13.9 GM/DL (ref 11.7–16.9)
MCH RBC QN AUTO: 28.2 PG (ref 25.7–33.7)
MCHC RBC AUTO-ENTMCNC: 31.9 G/DL (ref 32–35.9)
MCV RBC: 88.3 FL (ref 80–96)
PLATELET # BLD AUTO: 234 K/MM3 (ref 134–434)
PMV BLD: 10.3 FL (ref 7.5–11.1)
POTASSIUM SERPLBLD-SCNC: 4.1 MMOL/L (ref 3.5–5.1)
PROT SERPL-MCNC: 7.1 G/DL (ref 6.4–8.2)
RBC # BLD AUTO: 4.92 M/MM3 (ref 4–5.6)
SODIUM SERPL-SCNC: 140 MMOL/L (ref 136–145)
WBC # BLD AUTO: 6.5 K/MM3 (ref 4–10)

## 2018-11-17 RX ADMIN — SULFAMETHOXAZOLE AND TRIMETHOPRIM SCH: 800; 160 TABLET ORAL at 23:04

## 2018-11-17 RX ADMIN — NICOTINE SCH: 21 PATCH TRANSDERMAL at 14:20

## 2018-11-17 RX ADMIN — BACITRACIN ZINC SCH GM: 500 OINTMENT TOPICAL at 22:43

## 2018-11-17 RX ADMIN — Medication SCH TAB: at 14:16

## 2018-11-17 RX ADMIN — BACITRACIN ZINC SCH GM: 500 OINTMENT TOPICAL at 14:20

## 2018-11-17 RX ADMIN — BACITRACIN ZINC SCH: 500 OINTMENT TOPICAL at 23:01

## 2018-11-17 RX ADMIN — SULFAMETHOXAZOLE AND TRIMETHOPRIM SCH EACH: 800; 160 TABLET ORAL at 22:43

## 2018-11-17 RX ADMIN — Medication SCH MG: at 22:43

## 2018-11-17 RX ADMIN — NICOTINE POLACRILEX PRN MG: 2 GUM, CHEWING ORAL at 18:34

## 2018-11-17 RX ADMIN — SULFAMETHOXAZOLE AND TRIMETHOPRIM SCH EACH: 800; 160 TABLET ORAL at 14:17

## 2018-11-17 NOTE — PN
S CIWA





- CIWA Score


Nausea/Vomitin


Muscle Tremors: 2


Anxiety: 2


Agitation: 2


Paroxysmal Sweats: 2


Orientation: 0-Oriented


Tacttile Disturbances: 1-Very Mild Itch/Numbness


Auditory Disturbances: 0-None


Visual Disturbances: 0-None


Headache: 2-Mild


CIWA-Ar Total Score: 13





[facility] COWS





- Scale


Resting Pulse: 0= [place] 80 or Below


Sweatin= Chills/Flushing


Restless Observation: 1= Difficult to Sit Still


Pupil Size: 0= Normal to Room Light


Bone or Joint Aches: 2= Severe Diffuse Aches


Runny Nose/ Eye Tearin= Nasal Congestion


GI Upset > 30mins: 2= Nausea/Diarrhea


Tremor Observation of Outstretched Hands: 1= Tremor Felt, Not Seen


Yawning Observation: 0= None


Anxiety or Irritability: 2=Irritable/Anxious


Goose Flesh Skin: 0=Smooth Skin


COWS Score: 10





[facility] Progress Note (SOAP)


Subjective: 





Irritability, anxiety, muscle aches and interrupted sleep


Objective: 





18 10:41


 Vital Signs











  18





  03:30 06:00 09:21


 


Temperature  97.5 F L 98.2 F


 


Pulse Rate  57 L 62


 


Respiratory 16 18 20





Rate   


 


Blood Pressure  100/55 L 102/51 L








 Laboratory Last Values











Urine Color  Yellow   18  15:45    


 


Urine Appearance  Cloudy   18  15:45    


 


Urine pH  5.0  (5.0-8.0)   18  15:45    


 


Ur Specific Gravity  1.027  (1.010-1.035)   18  15:45    


 


Urine Protein  Negative  (NEGATIVE)   18  15:45    


 


Urine Glucose (UA)  Negative  (NEGATIVE)   18  15:45    


 


Urine Ketones  Negative  (NEGATIVE)   18  15:45    


 


Urine Blood  Negative  (NEGATIVE)   18  15:45    


 


Urine Nitrite  Negative  (NEGATIVE)   18  15:45    


 


Urine Bilirubin  Negative  (<2.0 mg/dL)   18  15:45    


 


Urine Urobilinogen  Negative mg/dL (0.2-1.0)   18  15:45    


 


Ur Leukocyte Esterase  Negative  (NEGATIVE)   18  15:45    








UA noted, negative


CBC/CMP pending


Assessment: 





18 10:42


Withdrawal sx


Plan: 





Continue detox

## 2018-11-18 RX ADMIN — SULFAMETHOXAZOLE AND TRIMETHOPRIM SCH EACH: 800; 160 TABLET ORAL at 22:41

## 2018-11-18 RX ADMIN — METHADONE HYDROCHLORIDE SCH MG: 5 TABLET ORAL at 10:49

## 2018-11-18 RX ADMIN — HYDROXYZINE PAMOATE PRN MG: 50 CAPSULE ORAL at 22:41

## 2018-11-18 RX ADMIN — NICOTINE SCH: 21 PATCH TRANSDERMAL at 10:49

## 2018-11-18 RX ADMIN — Medication SCH TAB: at 10:48

## 2018-11-18 RX ADMIN — Medication PRN MG: at 01:07

## 2018-11-18 RX ADMIN — SULFAMETHOXAZOLE AND TRIMETHOPRIM SCH EACH: 800; 160 TABLET ORAL at 10:48

## 2018-11-18 RX ADMIN — BACITRACIN ZINC SCH GM: 500 OINTMENT TOPICAL at 22:42

## 2018-11-18 RX ADMIN — BACITRACIN ZINC SCH GM: 500 OINTMENT TOPICAL at 10:48

## 2018-11-18 RX ADMIN — HYDROXYZINE PAMOATE PRN MG: 50 CAPSULE ORAL at 14:15

## 2018-11-18 RX ADMIN — Medication SCH MG: at 22:42

## 2018-11-18 NOTE — PN
[facility] CIWA





- CIWA Score


Nausea/Vomitin-No Nausea/No Vomiting


Muscle Tremors: 3


Anxiety: 2


Agitation: 2


Paroxysmal Sweats: 1-Minimal Palms Moist


Orientation: 0-Oriented


Tacttile Disturbances: 1-Very Mild Itch/Numbness


Auditory Disturbances: 1-Very Mild


Visual Disturbances: 0-None


Headache: 1-Very Mild


CIWA-Ar Total Score: 11





BHS COWS





- Scale


Resting Pulse: 0= [place] 80 or Below


Sweatin= Chills/Flushing


Restless Observation: 1= Difficult to Sit Still


Pupil Size: 0= Normal to Room Light


Bone or Joint Aches: 1= Mild Discomfort


Runny Nose/ Eye Tearin= Nasal Congestion


GI Upset > 30mins: 1= Stomach Cramp


Tremor Observation of Outstretched Hands: 1= Tremor Felt, Not Seen


Yawning Observation: 1= 1-2x During Session


Anxiety or Irritability: 1=Feels Anxious/Irritable


Goose Flesh Skin: 0=Smooth Skin


COWS Score: 8





BHS Progress Note (SOAP)


Subjective: 





mild withdrawal anxiety trouble sleep at night


Objective: 





18 14:24


 Vital Signs











Temperature  98.2 F   18 14:14


 


Pulse Rate  73   18 14:14


 


Respiratory Rate  18   18 14:14


 


Blood Pressure  112/68   18 14:14


 


O2 Sat by Pulse Oximetry (%)      








 Laboratory Last Values











WBC  6.5 K/mm3 (4.0-10.0)   18  05:35    


 


RBC  4.92 M/mm3 (4.00-5.60)   18  05:35    


 


Hgb  13.9 GM/dL (11.7-16.9)   18  05:35    


 


Hct  43.5 % (35.4-49)   18  05:35    


 


MCV  88.3 fl (80-96)   18  05:35    


 


MCH  28.2 pg (25.7-33.7)   18  05:35    


 


MCHC  31.9 g/dl (32.0-35.9)  L  18  05:35    


 


RDW  13.6 % (11.9-15.9)   18  05:35    


 


Plt Count  234 K/MM3 (134-434)   18  05:35    


 


MPV  10.3 fl (7.5-11.1)  D 18  05:35    


 


Sodium  140 mmol/L (136-145)   18  05:35    


 


Potassium  4.1 mmol/L (3.5-5.1)   18  05:35    


 


Chloride  104 mmol/L ()   18  05:35    


 


Carbon Dioxide  29 mmol/L (21-32)   18  05:35    


 


Anion Gap  6 MMOL/L (8-16)  L  18  05:35    


 


BUN  13 mg/dL (7-18)   18  05:35    


 


Creatinine  0.8 mg/dL (0.55-1.3)   18  05:35    


 


Creat Clearance w eGFR  > 60  (>60)   18  05:35    


 


Random Glucose  89 mg/dL ()   18  05:35    


 


Calcium  9.0 mg/dL (8.5-10.1)   18  05:35    


 


Total Bilirubin  0.3 mg/dL (0.2-1)   18  05:35    


 


AST  26 U/L (15-37)   18  05:35    


 


ALT  29 U/L (13-61)   18  05:35    


 


Alkaline Phosphatase  71 U/L ()   18  05:35    


 


Total Protein  7.1 g/dl (6.4-8.2)   18  05:35    


 


Albumin  3.4 g/dl (3.4-5.0)   18  05:35    


 


Urine Color  Yellow   18  15:45    


 


Urine Appearance  Cloudy   18  15:45    


 


Urine pH  5.0  (5.0-8.0)   18  15:45    


 


Ur Specific Gravity  1.027  (1.010-1.035)   18  15:45    


 


Urine Protein  Negative  (NEGATIVE)   18  15:45    


 


Urine Glucose (UA)  Negative  (NEGATIVE)   18  15:45    


 


Urine Ketones  Negative  (NEGATIVE)   18  15:45    


 


Urine Blood  Negative  (NEGATIVE)   18  15:45    


 


Urine Nitrite  Negative  (NEGATIVE)   18  15:45    


 


Urine Bilirubin  Negative  (<2.0 mg/dL)   18  15:45    


 


Urine Urobilinogen  Negative mg/dL (0.2-1.0)   18  15:45    


 


Ur Leukocyte Esterase  Negative  (NEGATIVE)   18  15:45    


 


RPR Titer  Nonreactive  (NONREACTIVE)   18  05:35    








lab noted


Assessment: 





18 14:24


mild withdrawal sx


Plan: 





medically supervised detox

## 2018-11-19 RX ADMIN — NICOTINE SCH: 21 PATCH TRANSDERMAL at 11:58

## 2018-11-19 RX ADMIN — Medication SCH TAB: at 11:58

## 2018-11-19 RX ADMIN — SULFAMETHOXAZOLE AND TRIMETHOPRIM SCH EACH: 800; 160 TABLET ORAL at 21:58

## 2018-11-19 RX ADMIN — SULFAMETHOXAZOLE AND TRIMETHOPRIM SCH EACH: 800; 160 TABLET ORAL at 11:58

## 2018-11-19 RX ADMIN — NICOTINE POLACRILEX PRN MG: 2 GUM, CHEWING ORAL at 11:58

## 2018-11-19 RX ADMIN — BACITRACIN ZINC SCH GM: 500 OINTMENT TOPICAL at 11:58

## 2018-11-19 RX ADMIN — NICOTINE POLACRILEX PRN MG: 2 GUM, CHEWING ORAL at 19:15

## 2018-11-19 RX ADMIN — NICOTINE POLACRILEX PRN MG: 2 GUM, CHEWING ORAL at 15:13

## 2018-11-19 RX ADMIN — BACITRACIN ZINC SCH GM: 500 OINTMENT TOPICAL at 21:57

## 2018-11-19 RX ADMIN — Medication SCH MG: at 22:00

## 2018-11-19 RX ADMIN — METHADONE HYDROCHLORIDE SCH MG: 5 TABLET ORAL at 11:57

## 2018-11-19 RX ADMIN — GABAPENTIN SCH MG: 100 CAPSULE ORAL at 21:58

## 2018-11-19 RX ADMIN — HYDROXYZINE PAMOATE PRN MG: 50 CAPSULE ORAL at 15:12

## 2018-11-19 RX ADMIN — Medication PRN MG: at 21:58

## 2018-11-19 NOTE — CONSULT
BHS Psychiatric Consult





- Data


Date of interview: 11/19/18


Admission source: [facility]


Identifying data: Another admission to [facility] for this 49 y/o  male 

seeking detoxification treatment, on [facility] [address], for cannabis, heroin, cocaine and 

alcohol dependence. Patient is single, no children, homeless, unemployed (no 

claims of income).


Substance Abuse History: Confirmed by patient in this interview. Details in 

current BHS report : Smoking history: Current every day smoker.  Have you 

smoked in the past 12 months: Yes.  Aproximately how many cigarettes per day: 

10.  Hx Chewing Tobacco Use: No.  Initiated information on smoking cessation: 

Yes.  'Breaking Loose' booklet given: 11/16/18.  - Substance & Tx. History.  Hx 

Alcohol Use: Yes.  Hx Substance Use: Yes.  Substance Use Type: Alcohol, Cocaine

, Heroin, Marijuana.  Hx Substance Use Treatment: Yes (project renewal 05/18 

not completed).  - Substances Abused.  ** Heroin.  Route: Injection.  Frequency

: Daily.  Amount used: 10 BAGS.  Age of first use: 17.  Date of Last Use: 11/15/

18.  ** Alcohol.  Route: Oral.  Frequency: Daily.  Amount used: 2 PINTS VODKA.  

Age of first use: 15.  Date of Last Use: 11/16/18.  ** Cocaine.  Route: 

Injection.  Frequency: Daily.  Amount used: 1/2 GRAM.  Age of first use: 19.  

Date of Last Use: 11/15/18.  ** Marijuana/Hashish.  Route: Smoking.  Frequency: 

Daily.  Amount used: 2 BLUNTS.  Age of first use: 12.  Date of Last Use: 11/13/ 18


Medical History: Hepatitis C and a history of withdrawal-related seizures.


Psychiatric History: No reported history of psychiatric hospitalizations. 

Patient reports a remote history of contacts with psychiatrists (during 

childhood + adolescence) for behavioral issues. Diagnosed with ADHD and treated

, at age 12, with psychostimulants (adderall and ritalin). Dropped out of 

psychiatric care. Mr Shay states that he has been " recently "  maintained 

on suboxone (review of pharmacy claims indicates refills on 4/2018). In the 

meantime, the patient was incarcerated (released in 9/2018). NO OPD care since 

his release from alf. No history of suicide attempts.


Physical/Sexual Abuse/Trauma History: Patient declines to discuss this domain.


Additional Comment: Urine Drug Screen Results: THC-Marijuana, FELISA-Cocaine, OPI-

Opiates, FEN-Fentanyl. Noted.





Mental Status Exam





- Mental Status Exam


Alert and Oriented to: Time, Place, Person


Cognitive Function: Good


Patient Appearance: Unkempt, Disheveled


Mood: Angry, Hostile, Irritable


Affect: Labile


Patient Behavior: Talkative (argumentative)


Speech Pattern: Clear, Excessive


Voice Loudness: Mildly Loud


Thought Process: Goal Oriented


Thought Disorder: Not Present


Hallucinations: Denies


Suicidal Ideation: Denies


Homicidal Ideation: Denies


Insight/Judgement: Poor


Sleep: Poorly, Difficulty falling asleep


Appetite: Good


Muscle strength/Tone: Normal


Gait/Station: Normal





Psychiatric Findings





- Problem List (Axis 1, 2,3)


(1) Opioid dependence with withdrawal


Current Visit: Yes   Status: Acute   





(2) Alcohol dependence with uncomplicated withdrawal


Current Visit: Yes   Status: Acute   





(3) Cannabis dependence


Current Visit: Yes   Status: Acute   





(4) Cocaine dependence, uncomplicated


Current Visit: Yes   Status: Acute   





(5) Nicotine dependence


Current Visit: Yes   Status: Acute   


Qualifiers: 


   Nicotine product type: cigarettes   Substance use status: uncomplicated   

Qualified Code(s): F17.210 - Nicotine dependence, cigarettes, uncomplicated   





(6) Substance induced mood disorder


Current Visit: Yes   Status: Acute   





(7) Insomnia


Current Visit: Yes   Status: Acute   





(8) Non-compliant patient


Current Visit: Yes   Status: Acute   





- Initial Treatment Plan


Initial Treatment Plan: Psychoeducation. Sleep hygiene. Detoxification. AA/NA 

meetings. Facilitate transition to rehabilitation (patient's expressed goal). 

Reassurance provided to the patient (writer met with patient's counselor to 

discuss disposition). Mr Shay calmed down after he learned, from his 

counselor, that referral papers have already been sent to [facility] rehab units in 

quest for a bed. Gabapentin 100 mg po tid. Resumed. Side effects/benefits 

discussed with patient. Declines seroquel, trazodone and mirtazapine (past 

experience of adverse effects). Mr Shay agrees to this careplan. Observation.

## 2018-11-19 NOTE — PN
BHS Progress Note (SOAP)


Subjective: 





sweat tremor body aches muscle cramp trouble sleep at night


Objective: 





11/19/18 14:46


 Vital Signs











Temperature  97.7 F   11/19/18 12:51


 


Pulse Rate  63   11/19/18 12:51


 


Respiratory Rate  16   11/19/18 12:51


 


Blood Pressure  102/54 L  11/19/18 12:51


 


O2 Sat by Pulse Oximetry (%)      








 Laboratory Last Values











WBC  6.5 K/mm3 (4.0-10.0)   11/17/18  05:35    


 


RBC  4.92 M/mm3 (4.00-5.60)   11/17/18  05:35    


 


Hgb  13.9 GM/dL (11.7-16.9)   11/17/18  05:35    


 


Hct  43.5 % (35.4-49)   11/17/18  05:35    


 


MCV  88.3 fl (80-96)   11/17/18  05:35    


 


MCH  28.2 pg (25.7-33.7)   11/17/18  05:35    


 


MCHC  31.9 g/dl (32.0-35.9)  L  11/17/18  05:35    


 


RDW  13.6 % (11.9-15.9)   11/17/18  05:35    


 


Plt Count  234 K/MM3 (134-434)   11/17/18  05:35    


 


MPV  10.3 fl (7.5-11.1)  D 11/17/18  05:35    


 


Sodium  140 mmol/L (136-145)   11/17/18  05:35    


 


Potassium  4.1 mmol/L (3.5-5.1)   11/17/18  05:35    


 


Chloride  104 mmol/L ()   11/17/18  05:35    


 


Carbon Dioxide  29 mmol/L (21-32)   11/17/18  05:35    


 


Anion Gap  6 MMOL/L (8-16)  L  11/17/18  05:35    


 


BUN  13 mg/dL (7-18)   11/17/18  05:35    


 


Creatinine  0.8 mg/dL (0.55-1.3)   11/17/18  05:35    


 


Creat Clearance w eGFR  > 60  (>60)   11/17/18  05:35    


 


Random Glucose  89 mg/dL ()   11/17/18  05:35    


 


Calcium  9.0 mg/dL (8.5-10.1)   11/17/18  05:35    


 


Total Bilirubin  0.3 mg/dL (0.2-1)   11/17/18  05:35    


 


AST  26 U/L (15-37)   11/17/18  05:35    


 


ALT  29 U/L (13-61)   11/17/18  05:35    


 


Alkaline Phosphatase  71 U/L ()   11/17/18  05:35    


 


Total Protein  7.1 g/dl (6.4-8.2)   11/17/18  05:35    


 


Albumin  3.4 g/dl (3.4-5.0)   11/17/18  05:35    


 


Urine Color  Yellow   11/16/18  15:45    


 


Urine Appearance  Cloudy   11/16/18  15:45    


 


Urine pH  5.0  (5.0-8.0)   11/16/18  15:45    


 


Ur Specific Gravity  1.027  (1.010-1.035)   11/16/18  15:45    


 


Urine Protein  Negative  (NEGATIVE)   11/16/18  15:45    


 


Urine Glucose (UA)  Negative  (NEGATIVE)   11/16/18  15:45    


 


Urine Ketones  Negative  (NEGATIVE)   11/16/18  15:45    


 


Urine Blood  Negative  (NEGATIVE)   11/16/18  15:45    


 


Urine Nitrite  Negative  (NEGATIVE)   11/16/18  15:45    


 


Urine Bilirubin  Negative  (<2.0 mg/dL)   11/16/18  15:45    


 


Urine Urobilinogen  Negative mg/dL (0.2-1.0)   11/16/18  15:45    


 


Ur Leukocyte Esterase  Negative  (NEGATIVE)   11/16/18  15:45    


 


RPR Titer  Nonreactive  (NONREACTIVE)   11/17/18  05:35    








lab noted


Assessment: 





11/19/18 14:47


withdrawal sx


Plan: 





continue detox

## 2018-11-20 ENCOUNTER — HOSPITAL ENCOUNTER (INPATIENT)
Dept: HOSPITAL 74 - YASAS | Age: 48
LOS: 14 days | Discharge: HOME | DRG: 772 | End: 2018-12-04
Attending: PSYCHIATRY & NEUROLOGY | Admitting: PSYCHIATRY & NEUROLOGY
Payer: COMMERCIAL

## 2018-11-20 VITALS — HEART RATE: 72 BPM | SYSTOLIC BLOOD PRESSURE: 109 MMHG | TEMPERATURE: 97.9 F | DIASTOLIC BLOOD PRESSURE: 77 MMHG

## 2018-11-20 VITALS — BODY MASS INDEX: 25.2 KG/M2

## 2018-11-20 DIAGNOSIS — F10.20: ICD-10-CM

## 2018-11-20 DIAGNOSIS — Z91.013: ICD-10-CM

## 2018-11-20 DIAGNOSIS — Z88.8: ICD-10-CM

## 2018-11-20 DIAGNOSIS — F12.20: ICD-10-CM

## 2018-11-20 DIAGNOSIS — F14.20: ICD-10-CM

## 2018-11-20 DIAGNOSIS — F19.282: ICD-10-CM

## 2018-11-20 DIAGNOSIS — F19.24: ICD-10-CM

## 2018-11-20 DIAGNOSIS — G47.00: ICD-10-CM

## 2018-11-20 DIAGNOSIS — F11.20: Primary | ICD-10-CM

## 2018-11-20 DIAGNOSIS — Z86.69: ICD-10-CM

## 2018-11-20 DIAGNOSIS — F17.213: ICD-10-CM

## 2018-11-20 DIAGNOSIS — B18.2: ICD-10-CM

## 2018-11-20 PROCEDURE — HZ42ZZZ GROUP COUNSELING FOR SUBSTANCE ABUSE TREATMENT, COGNITIVE-BEHAVIORAL: ICD-10-PCS | Performed by: PSYCHIATRY & NEUROLOGY

## 2018-11-20 RX ADMIN — SULFAMETHOXAZOLE AND TRIMETHOPRIM SCH: 800; 160 TABLET ORAL at 22:45

## 2018-11-20 RX ADMIN — BACITRACIN ZINC SCH GM: 500 OINTMENT TOPICAL at 10:29

## 2018-11-20 RX ADMIN — NICOTINE SCH: 21 PATCH TRANSDERMAL at 10:29

## 2018-11-20 RX ADMIN — GABAPENTIN SCH: 100 CAPSULE ORAL at 22:45

## 2018-11-20 RX ADMIN — GABAPENTIN SCH: 100 CAPSULE ORAL at 07:46

## 2018-11-20 RX ADMIN — Medication SCH: at 22:45

## 2018-11-20 RX ADMIN — SULFAMETHOXAZOLE AND TRIMETHOPRIM SCH EACH: 800; 160 TABLET ORAL at 10:29

## 2018-11-20 RX ADMIN — Medication SCH TAB: at 10:29

## 2018-11-20 NOTE — DS
BHS Detox Discharge Summary


Admission Date: 


11/16/18





Discharge Date: 11/20/18





- History


Present History: Alcohol Dependence, Opioid Dependence


Additional Comments: 





48 years old male admitted on 11/16/18 for alcohol and opiate withdrawal sx


alert no acute distress prefer to go to rehab today


[facility] today patient wants to go to rehab today and would 

like to have suboxone as opiate maintenance program 





- Physical Exam Results


Vital Signs: 


 Vital Signs











Temperature  98.2 F   11/20/18 09:37


 


Pulse Rate  59 L  11/20/18 09:37


 


Respiratory Rate  18   11/20/18 09:37


 


Blood Pressure  105/52 L  11/20/18 09:37


 


O2 Sat by Pulse Oximetry (%)      











Pertinent Admission Physical Exam Findings: 





alcohol and opiate withdrawal sx


 Vital Signs











Temperature  97.9 F   11/20/18 13:33


 


Pulse Rate  72   11/20/18 13:33


 


Respiratory Rate  18   11/20/18 13:33


 


Blood Pressure  109/77   11/20/18 13:33


 


O2 Sat by Pulse Oximetry (%)      








 Laboratory Last Values











WBC  6.5 K/mm3 (4.0-10.0)   11/17/18  05:35    


 


RBC  4.92 M/mm3 (4.00-5.60)   11/17/18  05:35    


 


Hgb  13.9 GM/dL (11.7-16.9)   11/17/18  05:35    


 


Hct  43.5 % (35.4-49)   11/17/18  05:35    


 


MCV  88.3 fl (80-96)   11/17/18  05:35    


 


MCH  28.2 pg (25.7-33.7)   11/17/18  05:35    


 


MCHC  31.9 g/dl (32.0-35.9)  L  11/17/18  05:35    


 


RDW  13.6 % (11.9-15.9)   11/17/18  05:35    


 


Plt Count  234 K/MM3 (134-434)   11/17/18  05:35    


 


MPV  10.3 fl (7.5-11.1)  D 11/17/18  05:35    


 


Sodium  140 mmol/L (136-145)   11/17/18  05:35    


 


Potassium  4.1 mmol/L (3.5-5.1)   11/17/18  05:35    


 


Chloride  104 mmol/L ()   11/17/18  05:35    


 


Carbon Dioxide  29 mmol/L (21-32)   11/17/18  05:35    


 


Anion Gap  6 MMOL/L (8-16)  L  11/17/18  05:35    


 


BUN  13 mg/dL (7-18)   11/17/18  05:35    


 


Creatinine  0.8 mg/dL (0.55-1.3)   11/17/18  05:35    


 


Creat Clearance w eGFR  > 60  (>60)   11/17/18  05:35    


 


Random Glucose  89 mg/dL ()   11/17/18  05:35    


 


Calcium  9.0 mg/dL (8.5-10.1)   11/17/18  05:35    


 


Total Bilirubin  0.3 mg/dL (0.2-1)   11/17/18  05:35    


 


AST  26 U/L (15-37)   11/17/18  05:35    


 


ALT  29 U/L (13-61)   11/17/18  05:35    


 


Alkaline Phosphatase  71 U/L ()   11/17/18  05:35    


 


Total Protein  7.1 g/dl (6.4-8.2)   11/17/18  05:35    


 


Albumin  3.4 g/dl (3.4-5.0)   11/17/18  05:35    


 


Urine Color  Yellow   11/16/18  15:45    


 


Urine Appearance  Cloudy   11/16/18  15:45    


 


Urine pH  5.0  (5.0-8.0)   11/16/18  15:45    


 


Ur Specific Gravity  1.027  (1.010-1.035)   11/16/18  15:45    


 


Urine Protein  Negative  (NEGATIVE)   11/16/18  15:45    


 


Urine Glucose (UA)  Negative  (NEGATIVE)   11/16/18  15:45    


 


Urine Ketones  Negative  (NEGATIVE)   11/16/18  15:45    


 


Urine Blood  Negative  (NEGATIVE)   11/16/18  15:45    


 


Urine Nitrite  Negative  (NEGATIVE)   11/16/18  15:45    


 


Urine Bilirubin  Negative  (<2.0 mg/dL)   11/16/18  15:45    


 


Urine Urobilinogen  Negative mg/dL (0.2-1.0)   11/16/18  15:45    


 


Ur Leukocyte Esterase  Negative  (NEGATIVE)   11/16/18  15:45    


 


RPR Titer  Nonreactive  (NONREACTIVE)   11/17/18  05:35    








lab noted





- Treatment


Hospital Course: Detox Protocol Followed, Detoxed Safely, Responded well, 

Discharged Condition Good, Rehab Referral Accepted


Patient has Accepted a [facility] Referral to: [facility]





- Medication


Discharge Medications: 


Ambulatory Orders





NK [No Known Home Medication]  11/16/18 











- Diagnosis


(1) Alcohol dependence with uncomplicated withdrawal


Current Visit: Yes   Status: Acute   





(2) Nicotine dependence


Current Visit: Yes   Status: Acute   


Qualifiers: 


   Nicotine product type: cigarettes   Substance use status: in withdrawal   

Qualified Code(s): F17.213 - Nicotine dependence, cigarettes, with withdrawal   





(3) Opioid dependence with withdrawal


Current Visit: Yes   Status: Acute   





(4) Substance induced mood disorder


Current Visit: Yes   Status: Suspected   





(5) Weight loss


Current Visit: Yes   Status: Acute   





(6) Hepatitis C


Current Visit: No   Status: Chronic   


Qualifiers: 


   Viral hepatitis chronicity: chronic   Hepatic coma status: without hepatic 

coma   Qualified Code(s): B18.2 - Chronic viral hepatitis C   





- AMA


Did Patient Leave Against Medical Advice: No

## 2018-11-20 NOTE — HP
BHS MD Rehab Assess/Revision





- Admission History


Admitted to Rehab from: [facility]


Date of Admission to Rehab: 11/20/18





- Findings


Detox History & Physical reviewed: Yes


Concur with findings: Yes


Comments/Additional Findings: transferred from detox to rehab admission as per 

protocol





Inpatient Rehab Admission





- Initial Determination


Are CD services needed?: Yes


Free of communicable disease: Yes


Not in need of hospitalization: Yes





- Rehab Admission Criteria


Previous failed treatment: Yes


Poor recovery environment: Yes


Comorbidities: Yes


Lacks judgement: No


Patient is meeting Inpatient Rehab admission criteria:: Yes

## 2018-11-20 NOTE — PN
BHS Progress Note (SOAP)


Subjective: 





feeling better no body aches no tremor sleep better at night 


wants on suboxone program 


counselor aware


Objective: 





11/20/18 12:10


 Vital Signs











Temperature  98.2 F   11/20/18 09:37


 


Pulse Rate  59 L  11/20/18 09:37


 


Respiratory Rate  18   11/20/18 09:37


 


Blood Pressure  105/52 L  11/20/18 09:37


 


O2 Sat by Pulse Oximetry (%)      








 Laboratory Last Values











WBC  6.5 K/mm3 (4.0-10.0)   11/17/18  05:35    


 


RBC  4.92 M/mm3 (4.00-5.60)   11/17/18  05:35    


 


Hgb  13.9 GM/dL (11.7-16.9)   11/17/18  05:35    


 


Hct  43.5 % (35.4-49)   11/17/18  05:35    


 


MCV  88.3 fl (80-96)   11/17/18  05:35    


 


MCH  28.2 pg (25.7-33.7)   11/17/18  05:35    


 


MCHC  31.9 g/dl (32.0-35.9)  L  11/17/18  05:35    


 


RDW  13.6 % (11.9-15.9)   11/17/18  05:35    


 


Plt Count  234 K/MM3 (134-434)   11/17/18  05:35    


 


MPV  10.3 fl (7.5-11.1)  D 11/17/18  05:35    


 


Sodium  140 mmol/L (136-145)   11/17/18  05:35    


 


Potassium  4.1 mmol/L (3.5-5.1)   11/17/18  05:35    


 


Chloride  104 mmol/L ()   11/17/18  05:35    


 


Carbon Dioxide  29 mmol/L (21-32)   11/17/18  05:35    


 


Anion Gap  6 MMOL/L (8-16)  L  11/17/18  05:35    


 


BUN  13 mg/dL (7-18)   11/17/18  05:35    


 


Creatinine  0.8 mg/dL (0.55-1.3)   11/17/18  05:35    


 


Creat Clearance w eGFR  > 60  (>60)   11/17/18  05:35    


 


Random Glucose  89 mg/dL ()   11/17/18  05:35    


 


Calcium  9.0 mg/dL (8.5-10.1)   11/17/18  05:35    


 


Total Bilirubin  0.3 mg/dL (0.2-1)   11/17/18  05:35    


 


AST  26 U/L (15-37)   11/17/18  05:35    


 


ALT  29 U/L (13-61)   11/17/18  05:35    


 


Alkaline Phosphatase  71 U/L ()   11/17/18  05:35    


 


Total Protein  7.1 g/dl (6.4-8.2)   11/17/18  05:35    


 


Albumin  3.4 g/dl (3.4-5.0)   11/17/18  05:35    


 


Urine Color  Yellow   11/16/18  15:45    


 


Urine Appearance  Cloudy   11/16/18  15:45    


 


Urine pH  5.0  (5.0-8.0)   11/16/18  15:45    


 


Ur Specific Gravity  1.027  (1.010-1.035)   11/16/18  15:45    


 


Urine Protein  Negative  (NEGATIVE)   11/16/18  15:45    


 


Urine Glucose (UA)  Negative  (NEGATIVE)   11/16/18  15:45    


 


Urine Ketones  Negative  (NEGATIVE)   11/16/18  15:45    


 


Urine Blood  Negative  (NEGATIVE)   11/16/18  15:45    


 


Urine Nitrite  Negative  (NEGATIVE)   11/16/18  15:45    


 


Urine Bilirubin  Negative  (<2.0 mg/dL)   11/16/18  15:45    


 


Urine Urobilinogen  Negative mg/dL (0.2-1.0)   11/16/18  15:45    


 


Ur Leukocyte Esterase  Negative  (NEGATIVE)   11/16/18  15:45    


 


RPR Titer  Nonreactive  (NONREACTIVE)   11/17/18  05:35    








lab noted


Assessment: 





11/20/18 12:11


mild withdrawal sx


Plan: 





medically supervised detox

## 2018-11-21 RX ADMIN — GABAPENTIN SCH MG: 100 CAPSULE ORAL at 13:16

## 2018-11-21 RX ADMIN — Medication SCH: at 21:20

## 2018-11-21 RX ADMIN — Medication SCH TAB: at 11:00

## 2018-11-21 RX ADMIN — Medication SCH MG: at 21:19

## 2018-11-21 RX ADMIN — SULFAMETHOXAZOLE AND TRIMETHOPRIM SCH EACH: 800; 160 TABLET ORAL at 21:19

## 2018-11-21 RX ADMIN — NICOTINE POLACRILEX PRN MG: 2 GUM, CHEWING ORAL at 11:52

## 2018-11-21 RX ADMIN — SUVOREXANT PRN MG: 10 TABLET, FILM COATED ORAL at 21:22

## 2018-11-21 RX ADMIN — SULFAMETHOXAZOLE AND TRIMETHOPRIM SCH EACH: 800; 160 TABLET ORAL at 11:00

## 2018-11-21 RX ADMIN — GABAPENTIN SCH: 100 CAPSULE ORAL at 05:57

## 2018-11-21 RX ADMIN — GABAPENTIN SCH MG: 100 CAPSULE ORAL at 21:19

## 2018-11-21 RX ADMIN — LIDOCAINE SCH PATCH: 50 PATCH TOPICAL at 11:00

## 2018-11-21 NOTE — PN
BHS Progress Note


Note: 





PT REPORTS WITHDRAWAL SX AND ALSO REPORTS HE WAS ON SUBOXONE IN THE PAST. PT IS 

VERY IRRITABLE, ANGRY, RESTLESS. PT WAS DISCHARGED FROM [address] [place] [address] 

YESTERDAY  AND  RECEIVED LAST DOSE OF METHADONE 10 MG ON 11/20/18.








AS PER PSYCH ADMISSION NOTES:


Substance Use Type: Alcohol (Started drinking alcohol at age 15, consumes 2 

pints of vodka daily. Last drank on 11/16/18), Cocaine (Started using cocaine 

at age 19, consumes half a gram daily. Last used on 11/15/18), Heroin (Started 

using heroin at age 17, consumes 10 bags daily. Lastused on 11/16/18), 

Marijuana (Started smoking marijuana at age 12, consumes 2 blunts daily. Last 

smoked on 11/13/18)


Hx Substance Use Treatment: Yes (3 previous inpt detox & one inpt rehab 

admissions @ [facility])








 Vital Signs











  11/21/18 11/21/18





  03:30 06:33


 


Temperature  97.9 F


 


Pulse Rate  59 L


 


Respiratory 18 18





Rate  


 


Blood Pressure  113/71








PLAN:CLONIDINE 0.1 MG PO BID AS DIRECTED


CONSIDER  STARTING SUBOXONE 4MG/1 MG SL  AFTER 48 HRS POST DETOX.

## 2018-11-21 NOTE — HP
Psychiatrist Admission





- Data


Date of interview: 11/21/18


Admission source: 6N


Identifying data: This is the second [facility] 

admission for this 48 years old single  male, unemployed on food stamp

, homeless


Medical History: Significant for hepatitis C and a history of seizure disorder. 

Smokes 10 cigarettes daily


Psychiatric History: Reports that he was diagnosed with ADHD at age 12 and was 

prescribed psychostimulants(Ritalin, Adderall) which he stopped taking at age 

19. Reports seeing psychiatrist while serving time(residential, long term). He has been 

tried on several medications including Haldol, Mellaril, Cogentin, Trazadone 

etc. Told writer that he has not had OPD care and has been off psychotropic 

medication due to homelessness since he was released from long term in 2016. 

According to entry from this facility EMR, he has has 4admissions here since 

October 2016. While in rehab in April 2018, he was prescribed Gabapentin 100 mg 

po TID and Sinequan 25 mg po HS. Most recent admission he was seen by Dr Herman 

on 11/19/18 and prescribed Gabapentin 100 mg po TID. Denies previous 

psychiatric hospitalization or suicidal attempt. At present, he feels irritable 

and reports sleeping poorly. Told writer that he is not prescribed Suboxone he 

will leave.


Physical/Sexual Abuse/Trauma History: Denies history of sexual, physical and 

verbal abuse.


Additional Comment: Reports history of multiple previous arrests including 6 

felony convictions. Denies being on parole/probation


Vital Signs: 


 Vital Signs - 24 hr











  11/20/18 11/20/18 11/21/18





  14:57 15:24 03:30


 


Temperature 97.5 F L 97.5 F L 


 


Pulse Rate 66 66 


 


Respiratory 18 18 18





Rate   


 


Blood Pressure 115/66 115/66 











Allergies/Adverse Reactions: 


 Allergies











Allergy/AdvReac Type Severity Reaction Status Date / Time


 


Fish Containing Products Allergy Severe Hives Verified 11/16/18 11:06


 


fish derived Allergy Severe Hives Verified 11/16/18 11:06


 


carbamazepine [From Tegretol] Allergy Intermediate Swelling Verified 11/16/18 11

:06


 


levetiracetam [From Keppra] Allergy Intermediate Swelling Verified 11/16/18 11:

06


 


phenobarbital Allergy Intermediate Swelling Verified 11/16/18 11:06


 


phenytoin sodium Allergy Intermediate Swelling Verified 11/16/18 11:06





[From Dilantin]     


 


phenytoin sodium extended Allergy Intermediate Swelling Verified 11/16/18 11:06





[From Dilantin]     











Date of last physical exam: 11/16/18


Concur with the findings of this exam: Yes





- Substance Abuse/Tx History


Hx Alcohol Use: Yes


Hx Substance Use: Yes


Substance Use Type: Alcohol (Started drinking alcohol at age 15, consumes 2 

pints of vodka daily. Last drank on 11/16/18), Cocaine (Started using cocaine 

at age 19, consumes half a gram daily. Last used on 11/15/18), Heroin (Started 

using heroin at age 17, consumes 10 bags daily. Lastused on 11/16/18), 

Marijuana (Started smoking marijuana at age 12, consumes 2 blunts daily. Last 

smoked on 11/13/18)


Hx Substance Use Treatment: Yes (3 previous inpt detox & one inpt rehab 

admissions @ [facility])





Mental Status Exam





- Mental Status Exam


Alert and Oriented to: Place, Person


Cognitive Function: Fair


Patient Appearance: Well Groomed


Mood: Depressed, Irritable


Affect: Appropriate


Speech Pattern: Clear


Voice Loudness: Normal


Thought Process: Intact, Goal Oriented


Hallucinations: Denies


Suicidal Ideation: Denies


Homicidal Ideation: Denies


Insight/Judgement: Fair


Sleep: Poorly


Appetite: Poor


Muscle strength/Tone: Normal


Gait/Station: Normal





Psychiatric Findings





- Problem List (Axis 1, 2,3)


(1) Alcohol dependence


Current Visit: No   Status: Acute   





(2) Opioid dependence


Current Visit: Yes   Status: Acute   





(3) Cocaine dependence


Current Visit: Yes   Status: Acute   





(4) Cannabis dependence


Current Visit: No   Status: Acute   





(5) Nicotine dependence


Current Visit: No   Status: Chronic   


Qualifiers: 


   Nicotine product type: cigarettes   Substance use status: in withdrawal   

Qualified Code(s): F17.213 - Nicotine dependence, cigarettes, with withdrawal   





(6) Substance induced mood disorder


Current Visit: Yes   Status: Acute   





(7) Substance-induced sleep disorder


Current Visit: Yes   Status: Acute   





(8) History of seizure


Current Visit: No   Status: Chronic   





(9) Hepatitis C


Current Visit: No   Status: Chronic   


Qualifiers: 


   Viral hepatitis chronicity: chronic   Hepatic coma status: without hepatic 

coma   Qualified Code(s): B18.2 - Chronic viral hepatitis C   





- Initial Treatment Plan


Initial Treatment Plan: 1) Continue Gabapentin 100 mg po TID.  2) Start 

Belsomra 10 mg po HS prn for insomnia.  3) Monitor progress

## 2018-11-22 RX ADMIN — SULFAMETHOXAZOLE AND TRIMETHOPRIM SCH EACH: 800; 160 TABLET ORAL at 21:29

## 2018-11-22 RX ADMIN — GABAPENTIN SCH MG: 100 CAPSULE ORAL at 06:46

## 2018-11-22 RX ADMIN — LIDOCAINE SCH: 50 PATCH TOPICAL at 10:32

## 2018-11-22 RX ADMIN — Medication SCH TAB: at 10:32

## 2018-11-22 RX ADMIN — GABAPENTIN SCH MG: 100 CAPSULE ORAL at 14:31

## 2018-11-22 RX ADMIN — NICOTINE POLACRILEX PRN MG: 2 GUM, CHEWING ORAL at 21:28

## 2018-11-22 RX ADMIN — BUPRENORPHINE HYDROCHLORIDE, NALOXONE HYDROCHLORIDE SCH EACH: 2; .5 FILM, SOLUBLE BUCCAL; SUBLINGUAL at 17:59

## 2018-11-22 RX ADMIN — Medication SCH MG: at 21:26

## 2018-11-22 RX ADMIN — SULFAMETHOXAZOLE AND TRIMETHOPRIM SCH EACH: 800; 160 TABLET ORAL at 10:32

## 2018-11-22 RX ADMIN — SUVOREXANT PRN MG: 10 TABLET, FILM COATED ORAL at 21:27

## 2018-11-22 RX ADMIN — GABAPENTIN SCH MG: 100 CAPSULE ORAL at 21:26

## 2018-11-22 RX ADMIN — Medication SCH EACH: at 21:28

## 2018-11-22 NOTE — PN
BHS COWS





- Scale


Resting Pulse: 0= ID 80 or Below


Sweatin= Chills/Flushing


Restless Observation: 1= Difficult to Sit Still


Pupil Size: 0= Normal to Room Light


Bone or Joint Aches: 2= Severe Diffuse Aches


Runny Nose/ Eye Tearin= Runny Nose/Eyes


GI Upset > 30mins: 0= None


Tremor Observation of Outstretched Hands: 0= None


Yawning Observation: 0= None


Anxiety or Irritability: 4=Extreme Anxiety


Goose Flesh Skin: 0=Smooth Skin


COWS Score: 10

## 2018-11-22 NOTE — PN
BHS Progress Note (SOAP)


Subjective: 





pt very agitated , requesting Suboxone  , previously on MAT 


This report was requested by: Oneida Kimball | Reference #: [id number]





Others' Prescriptions


Patient Name:   Walter Shay   YOB: 1970


Address:   [address] [place] [address]   Sex:   Male


Rx Written   Rx Dispensed   Drug   Quantity   Days Supply   Prescriber Name


09/20/2018 09/20/2018   suboxone 8 mg-2 mg sl film   9   3   Laks, Tacho SOUZA


09/18/2018 09/18/2018   suboxone 8 mg-2 mg sl film   3   1   Laks, Tacho SOUZA


09/14/2018 09/14/2018   suboxone 8 mg-2 mg sl film   9   3   Laks, Tacho SOUZA


09/14/2018 09/14/2018   chlordiazepoxide 10 mg capsule   36   4   Laks, Tacho SOUZA


02/05/2018 02/05/2018   suboxone 8 mg-2 mg sl film   60   30   Miguelito Turner ([facility])


02/02/2018 02/02/2018   suboxone 8 mg-2 mg sl film   3   1   Laks, Tacho SOUZA


01/25/2018 01/25/2018   suboxone 8 mg-2 mg sl film   9   3   Enriqueta Reese MD


01/16/2018 01/16/2018   suboxone 8 mg-2 mg sl film   9   3   Laks, Tacho SOUZA


01/11/2018 01/11/2018   suboxone 8 mg-2 mg sl film   9   3   Laks, Tacho SOUZA


01/11/2018 01/11/2018   chlordiazepoxide 10 mg capsule   18   2   Laks, Tacho SOUZA


Patient Name:   Walter Shay   YOB: 1970


Address:   [address] [place] [id number]   Sex:   Male


Rx Written   Rx Dispensed   Drug   Quantity   Days Supply   Prescriber Name


04/12/2018 04/16/2018   suboxone 8 mg-2 mg sl film   60   30   Roel Henry


02/24/2018 03/05/2018   suboxone 8 mg-2 mg sl film   60   30   Britni Christy MD


Objective: 





11/22/18 10:25


COWS=10 


 Vital Signs - 24 hr











  11/21/18 11/22/18 11/22/18





  21:14 00:30 03:30


 


Temperature   


 


Pulse Rate 67  


 


Respiratory 18 16 16





Rate   


 


Blood Pressure 112/62  














  11/22/18 11/22/18





  06:25 09:30


 


Temperature 97.5 F L 


 


Pulse Rate 58 L 60


 


Respiratory 18 18





Rate  


 


Blood Pressure 118/70 116/70











Assessment: 


opioid dependence 


11/22/18 10:25





Plan: 





start Suboxone 2/0.5 mg x one now , observe for symptoms 


if no adverse reaction , add 4 mg  this evening , then 4 mg bid tomorrow and  

continue dose adjustments . 


Pt understands that he will need to establish care for outpatient program and  

followup after discharge from this facility

## 2018-11-23 RX ADMIN — BUPRENORPHINE HYDROCHLORIDE, NALOXONE HYDROCHLORIDE SCH EACH: 2; .5 FILM, SOLUBLE BUCCAL; SUBLINGUAL at 09:53

## 2018-11-23 RX ADMIN — Medication SCH MG: at 21:20

## 2018-11-23 RX ADMIN — GABAPENTIN SCH MG: 100 CAPSULE ORAL at 06:05

## 2018-11-23 RX ADMIN — SUVOREXANT PRN MG: 10 TABLET, FILM COATED ORAL at 21:21

## 2018-11-23 RX ADMIN — Medication SCH TAB: at 09:53

## 2018-11-23 RX ADMIN — GABAPENTIN SCH MG: 100 CAPSULE ORAL at 21:20

## 2018-11-23 RX ADMIN — Medication SCH EACH: at 21:22

## 2018-11-23 RX ADMIN — NICOTINE POLACRILEX PRN MG: 2 GUM, CHEWING ORAL at 10:56

## 2018-11-23 RX ADMIN — NICOTINE POLACRILEX PRN MG: 2 GUM, CHEWING ORAL at 13:34

## 2018-11-23 RX ADMIN — BUPRENORPHINE HYDROCHLORIDE, NALOXONE HYDROCHLORIDE SCH EACH: 2; .5 FILM, SOLUBLE BUCCAL; SUBLINGUAL at 21:23

## 2018-11-23 RX ADMIN — GABAPENTIN SCH MG: 100 CAPSULE ORAL at 13:33

## 2018-11-23 RX ADMIN — LIDOCAINE SCH PATCH: 50 PATCH TOPICAL at 09:53

## 2018-11-24 RX ADMIN — BUPRENORPHINE HYDROCHLORIDE, NALOXONE HYDROCHLORIDE SCH EACH: 2; .5 FILM, SOLUBLE BUCCAL; SUBLINGUAL at 21:40

## 2018-11-24 RX ADMIN — Medication SCH TAB: at 09:58

## 2018-11-24 RX ADMIN — GABAPENTIN SCH MG: 100 CAPSULE ORAL at 21:41

## 2018-11-24 RX ADMIN — BUPRENORPHINE HYDROCHLORIDE, NALOXONE HYDROCHLORIDE SCH EACH: 2; .5 FILM, SOLUBLE BUCCAL; SUBLINGUAL at 09:59

## 2018-11-24 RX ADMIN — Medication SCH MG: at 21:40

## 2018-11-24 RX ADMIN — GABAPENTIN SCH MG: 100 CAPSULE ORAL at 06:07

## 2018-11-24 RX ADMIN — GABAPENTIN SCH MG: 100 CAPSULE ORAL at 13:51

## 2018-11-24 RX ADMIN — LIDOCAINE SCH PATCH: 50 PATCH TOPICAL at 09:59

## 2018-11-24 RX ADMIN — NICOTINE POLACRILEX PRN MG: 2 GUM, CHEWING ORAL at 09:59

## 2018-11-24 RX ADMIN — NICOTINE POLACRILEX PRN MG: 2 GUM, CHEWING ORAL at 13:51

## 2018-11-24 RX ADMIN — Medication SCH EACH: at 21:43

## 2018-11-24 RX ADMIN — NICOTINE POLACRILEX PRN MG: 2 GUM, CHEWING ORAL at 21:41

## 2018-11-24 NOTE — PN
BHS Progress Note


Note: 





Psychiatry


Attending's note (coverage) : 


Called for renewal of belsomra.


Chart reviewed. Dose confirmed.


No report of adverse effects.


Belsomra 10 mg po hs prn.


Re-ordered.

## 2018-11-25 RX ADMIN — LIDOCAINE SCH: 50 PATCH TOPICAL at 10:04

## 2018-11-25 RX ADMIN — GABAPENTIN SCH MG: 100 CAPSULE ORAL at 13:13

## 2018-11-25 RX ADMIN — BUPRENORPHINE HYDROCHLORIDE, NALOXONE HYDROCHLORIDE SCH EACH: 2; .5 FILM, SOLUBLE BUCCAL; SUBLINGUAL at 10:03

## 2018-11-25 RX ADMIN — SUVOREXANT PRN MG: 10 TABLET, FILM COATED ORAL at 21:54

## 2018-11-25 RX ADMIN — Medication SCH EACH: at 21:56

## 2018-11-25 RX ADMIN — GABAPENTIN SCH MG: 100 CAPSULE ORAL at 06:08

## 2018-11-25 RX ADMIN — GABAPENTIN SCH MG: 100 CAPSULE ORAL at 21:54

## 2018-11-25 RX ADMIN — BUPRENORPHINE HYDROCHLORIDE, NALOXONE HYDROCHLORIDE SCH EACH: 2; .5 FILM, SOLUBLE BUCCAL; SUBLINGUAL at 21:55

## 2018-11-25 RX ADMIN — Medication SCH MG: at 21:54

## 2018-11-25 RX ADMIN — NICOTINE POLACRILEX PRN MG: 2 GUM, CHEWING ORAL at 13:15

## 2018-11-25 RX ADMIN — NICOTINE POLACRILEX PRN MG: 2 GUM, CHEWING ORAL at 22:01

## 2018-11-25 RX ADMIN — Medication SCH TAB: at 10:03

## 2018-11-26 RX ADMIN — Medication SCH TAB: at 10:15

## 2018-11-26 RX ADMIN — NICOTINE POLACRILEX PRN MG: 2 GUM, CHEWING ORAL at 14:45

## 2018-11-26 RX ADMIN — GABAPENTIN SCH MG: 100 CAPSULE ORAL at 14:44

## 2018-11-26 RX ADMIN — Medication SCH EACH: at 22:07

## 2018-11-26 RX ADMIN — BUPRENORPHINE HYDROCHLORIDE, NALOXONE HYDROCHLORIDE SCH EACH: 2; .5 FILM, SOLUBLE BUCCAL; SUBLINGUAL at 10:15

## 2018-11-26 RX ADMIN — SUVOREXANT PRN MG: 10 TABLET, FILM COATED ORAL at 22:08

## 2018-11-26 RX ADMIN — LIDOCAINE SCH PATCH: 50 PATCH TOPICAL at 10:15

## 2018-11-26 RX ADMIN — BUPRENORPHINE HYDROCHLORIDE, NALOXONE HYDROCHLORIDE SCH EACH: 2; .5 FILM, SOLUBLE BUCCAL; SUBLINGUAL at 20:02

## 2018-11-26 RX ADMIN — GABAPENTIN SCH MG: 100 CAPSULE ORAL at 06:14

## 2018-11-26 RX ADMIN — GABAPENTIN SCH MG: 100 CAPSULE ORAL at 22:08

## 2018-11-26 RX ADMIN — NICOTINE POLACRILEX PRN MG: 2 GUM, CHEWING ORAL at 20:04

## 2018-11-26 RX ADMIN — NICOTINE POLACRILEX PRN MG: 2 GUM, CHEWING ORAL at 11:22

## 2018-11-26 RX ADMIN — Medication SCH MG: at 22:08

## 2018-11-26 NOTE — PN
BHS Progress Note


Note: 


PT IS ON SUBOXONE 4 MG BID NOW. REPORTS HOT/COLD SWEATS, AGITATED,NASAL 

CONGESTION/RUNNY NOSE, BACK PAIN. PT REPORTS HE WAS ON SUBOXONE 8 MG/2 MG SL 

BID PREVIOUS MONTHS AND WILL LIKE TO INCREASE TO FORMER DOSE.


 Vital Signs











  11/26/18





  07:23


 


Temperature 97.8 F


 


Pulse Rate 58 L


 


Respiratory 18





Rate 


 


Blood Pressure 95/56 L








 NAD





PLAN:D/C CLONIDINE


INCREASES SUBOXONE 6 MG/ 1.5 MG SL BID


RE-EVALUATE AND ADJUST AS NEEDED

## 2018-11-27 RX ADMIN — NICOTINE POLACRILEX PRN MG: 2 GUM, CHEWING ORAL at 09:57

## 2018-11-27 RX ADMIN — GABAPENTIN SCH MG: 100 CAPSULE ORAL at 14:03

## 2018-11-27 RX ADMIN — Medication SCH TAB: at 09:57

## 2018-11-27 RX ADMIN — NICOTINE POLACRILEX PRN MG: 2 GUM, CHEWING ORAL at 14:03

## 2018-11-27 RX ADMIN — NICOTINE POLACRILEX PRN MG: 2 GUM, CHEWING ORAL at 22:09

## 2018-11-27 RX ADMIN — NICOTINE POLACRILEX PRN MG: 2 GUM, CHEWING ORAL at 18:19

## 2018-11-27 RX ADMIN — GABAPENTIN SCH MG: 100 CAPSULE ORAL at 06:01

## 2018-11-27 RX ADMIN — GABAPENTIN SCH MG: 100 CAPSULE ORAL at 22:09

## 2018-11-27 RX ADMIN — BUPRENORPHINE HYDROCHLORIDE, NALOXONE HYDROCHLORIDE SCH EACH: 2; .5 FILM, SOLUBLE BUCCAL; SUBLINGUAL at 06:01

## 2018-11-27 RX ADMIN — Medication SCH MG: at 22:09

## 2018-11-27 RX ADMIN — SUVOREXANT PRN MG: 10 TABLET, FILM COATED ORAL at 22:09

## 2018-11-27 RX ADMIN — Medication SCH EACH: at 22:00

## 2018-11-27 RX ADMIN — LIDOCAINE SCH PATCH: 50 PATCH TOPICAL at 09:57

## 2018-11-27 RX ADMIN — BUPRENORPHINE HYDROCHLORIDE, NALOXONE HYDROCHLORIDE SCH EACH: 2; .5 FILM, SOLUBLE BUCCAL; SUBLINGUAL at 18:19

## 2018-11-27 RX ADMIN — NICOTINE PRN MG: 14 PATCH, EXTENDED RELEASE TRANSDERMAL at 09:58

## 2018-11-27 NOTE — PN
BHS Progress Note


Note: 





Psychiatric nurse practitioner note:


Chart reviewed. Belsomra 10mg renewed X7 days. Verbal consent given.

## 2018-11-28 RX ADMIN — NICOTINE POLACRILEX PRN MG: 2 GUM, CHEWING ORAL at 07:00

## 2018-11-28 RX ADMIN — NICOTINE POLACRILEX PRN MG: 2 GUM, CHEWING ORAL at 13:37

## 2018-11-28 RX ADMIN — NICOTINE PRN MG: 14 PATCH, EXTENDED RELEASE TRANSDERMAL at 10:04

## 2018-11-28 RX ADMIN — Medication SCH EACH: at 23:52

## 2018-11-28 RX ADMIN — BUPRENORPHINE HYDROCHLORIDE, NALOXONE HYDROCHLORIDE SCH EACH: 8; 2 FILM, SOLUBLE BUCCAL; SUBLINGUAL at 18:00

## 2018-11-28 RX ADMIN — LIDOCAINE SCH PATCH: 50 PATCH TOPICAL at 10:05

## 2018-11-28 RX ADMIN — SUVOREXANT PRN MG: 10 TABLET, FILM COATED ORAL at 21:34

## 2018-11-28 RX ADMIN — Medication SCH TAB: at 10:04

## 2018-11-28 RX ADMIN — GABAPENTIN SCH MG: 100 CAPSULE ORAL at 13:36

## 2018-11-28 RX ADMIN — GABAPENTIN SCH MG: 100 CAPSULE ORAL at 21:32

## 2018-11-28 RX ADMIN — NICOTINE POLACRILEX PRN MG: 2 GUM, CHEWING ORAL at 10:05

## 2018-11-28 RX ADMIN — BUPRENORPHINE HYDROCHLORIDE, NALOXONE HYDROCHLORIDE SCH EACH: 2; .5 FILM, SOLUBLE BUCCAL; SUBLINGUAL at 06:12

## 2018-11-28 RX ADMIN — GABAPENTIN SCH MG: 100 CAPSULE ORAL at 06:12

## 2018-11-28 RX ADMIN — Medication SCH MG: at 21:32

## 2018-11-28 NOTE — PN
Psychiatric Progress Note


Vital Signs: 


 Vital Signs











 Period  Temp  Pulse  Resp  BP Sys/Knight  Pulse Ox


 


 Last 24 Hr  97.6 F  53  16-18  122/60  











Date of Session: 11/28/18


Chief Complaint:: " I still cannot sleep at night ".


HPI: Referred for psychiatric evaluation because of complaint of insomnia. 

Patient is doing fine otherwise. Stable hospital course.


ROS: Unremarkable.


Current Medications: 


Active Medications











Generic Name Dose Route Start Last Admin





  Trade Name Freq  PRN Reason Stop Dose Admin


 


Acetaminophen  650 mg  11/20/18 15:14  





  Tylenol -  PO   





  Q4H PRN   





  FEVER   





     





     





     


 


Al Hydroxide/Mg Hydroxide  30 ml  11/20/18 15:14  





  Mylanta Oral Suspension -  PO   





  Q6H PRN   





  DYSPEPSIA   





     





     





     


 


Buprenorphine/Naloxone  1 each  11/28/18 18:00  





  Suboxone 8mg/2mg Sl Film -  SL  12/05/18 17:59  





  BID@0600,1800 MISTY   





     





     





     





     


 


Eucalyptus/Menthol/Phenol/Sorbitol  1 each  11/20/18 15:14  11/26/18 22:08





  Cepastat Lozenge -  MM   1 each





  Q4H PRN   Administration





  SORE THROAT   





     





     





     


 


Gabapentin  100 mg  11/20/18 22:00  11/28/18 06:12





  Neurontin -  PO   100 mg





  TID MISTY   Administration





     





     





     





     


 


Guaifenesin  10 ml  11/20/18 15:14  





  Robitussin Dm -  PO   





  Q6H PRN   





  COUGH   





     





     





     


 


Ibuprofen  400 mg  11/20/18 15:14  





  Motrin -  PO   





  Q6H PRN   





  Pain Level 4-6   





     





     





     


 


Lidocaine  1 patch  11/21/18 10:00  11/28/18 10:05





  Lidoderm Patch -  TP   1 patch





  DAILY MISTY   Administration





     





     





     





     


 


Loperamide HCl  4 mg  11/20/18 15:14  





  Imodium -  PO   





  Q6H PRN   





  DIARRHEA   





     





     





     


 


Magnesium Citrate  300 ml  11/20/18 15:14  





  Citroma -  PO   





  Q48H PRN   





  CONSTIPATION   





     





     





     


 


Magnesium Hydroxide  30 ml  11/20/18 15:14  





  Milk Of Magnesia -  PO   





  DAILY PRN   





  CONSTIPATION   





     





     





     


 


Melatonin  5 mg  11/20/18 22:00  





  Melatonin  PO   





  HS PRN   





  INSOMNIA   





     





     





     


 


Methyl Salicylate  1 applic  11/20/18 15:24  





  Peter-Mario -  TP   





  BID PRN   





  BACK PAIN   





     





     





     


 


Miscellaneous  1 each  11/21/18 22:00  11/27/18 22:00





  Lidoderm Patch Removal  MC   1 each





  DAILY@2200 MISTY   Administration





     





     





     





     


 


Nicotine  14 mg  11/20/18 17:00  11/28/18 10:04





  Nicoderm Patch -  TD   14 mg





  DAILY PRN   Administration





  WITHDRAWAL   





     





     





     


 


Nicotine Polacrilex  2 mg  11/20/18 15:14  11/28/18 10:05





  Nicorette Gum -  BUC   2 mg





  Q2H PRN   Administration





  NICOTINE REPLACEMENT RX   





     





     





     


 


Prenatal Multivit/Folic Acid/Iron  1 tab  11/21/18 10:00  11/28/18 10:04





  Prenatal Vitamins (Sjr) -  PO   1 tab





  DAILY MISTY   Administration





     





     





     





     


 


Pseudoephedrine/Triprolidine  1 combo  11/20/18 15:14  





  Actifed -  PO   





  TID PRN   





  NASAL CONGESTION   





     





     





     


 


Quetiapine Fumarate  100 mg  11/28/18 22:00  





  Seroquel -  PO   





  HS MISTY   





     





     





     





     


 


Suvorexant  10 mg  11/27/18 22:00  11/27/18 22:09





  Belsomra  PO  11/30/18 21:59  10 mg





  HS PRN   Administration





  INSOMNIA   





     





     





     


 


Thiamine HCl  100 mg  11/20/18 22:00  11/27/18 22:09





  Vitamin B1 -  PO   100 mg





  HS MISTY   Administration





     





     





     





     











Medication(s) Change(s): Patient agrees to resume seroquel at a low dose (50 mg 

po hs). Side effects/benefits discussed with the patient. Mr Jaspal lara with 

this plan of care.


Current Side Effect: No


Lab tests ordered: No


Lab tests reviewed: Yes


Provider note:: Patient is already known to this writer. Chart reviewed. No 

salient issue except for recurrent complaint of insomnia. Unremarkable mental 

status. Seroquel 50 mg/hs. Ordered. Will follow response.


Total face to face time:: 25





Mental Status Exam





- Mental Status Exam


Alert and Oriented to: Time, Place, Person


Cognitive Function: Good


Patient Appearance: Well Groomed


Mood: Hopeful, Euthymic


Affect: Appropriate, Normal Range


Patient Behavior: Cooperative


Speech Pattern: Clear, Appropriate


Voice Loudness: Normal


Thought Process: Intact, Goal Oriented


Thought Disorder: Not Present


Hallucinations: Denies


Suicidal Ideation: Denies


Homicidal Ideation: Denies


Insight/Judgement: Fair


Sleep: Poorly, Difficulty falling asleep


Appetite: Good


Muscle strength/Tone: Normal


Gait/Station: Normal





Psychiatric Treatment Plan





- Problem List


(1) Alcohol dependence


Current Visit: Yes   Comment: .   





(2) Opioid dependence


Current Visit: Yes   Comment: .   





(3) Cocaine dependence


Current Visit: Yes   Comment: .   





(4) Cannabis dependence


Current Visit: Yes   Comment: .   





(5) Nicotine dependence


Current Visit: Yes   


Qualifiers: 


   Nicotine product type: cigarettes   Substance use status: in withdrawal   

Qualified Code(s): F17.213 - Nicotine dependence, cigarettes, with withdrawal   


Comment: .   





(6) Substance induced mood disorder


Current Visit: Yes   Comment: .   





(7) Insomnia


Current Visit: Yes   Comment: .

## 2018-11-28 NOTE — PN
BHS COWS





- Scale


Resting Pulse: 0= [place] 80 or Below


Sweatin= Chills/Flushing


Restless Observation: 0= Sits Still


Pupil Size: 0= Normal to Room Light


Bone or Joint Aches: 4=Acute Joint/Muscle Pain


Runny Nose/ Eye Tearin= Nasal Congestion


GI Upset > 30mins: 1= Stomach Cramp


Tremor Observation of Outstretched Hands: 0= None


Yawning Observation: 0= None


Anxiety or Irritability: 2=Irritable/Anxious


Goose Flesh Skin: 0=Smooth Skin


COWS Score: 9





BHS Progress Note (SOAP)


Subjective: 





C/O IRRITABILITY,BODY ACHES, HOT/COLD,NASAL CONGESTION. REQUESTING INCREASED 

DOSE OF SUBOXONE.


Objective: 





18 10:24


 Vital Signs











  18





  06:43


 


Temperature 97.6 F


 


Pulse Rate 53 L


 


Respiratory 16





Rate 


 


Blood Pressure 122/60











COWS =9





Assessment: 





18 10:24


W/S


Plan: 





INCREASE SUBOXONE TO 8 MG/2 MG SL BID AS DIRECTED


INCREASE PO FLUIDS

## 2018-11-29 RX ADMIN — NICOTINE PRN MG: 14 PATCH, EXTENDED RELEASE TRANSDERMAL at 09:51

## 2018-11-29 RX ADMIN — Medication SCH TAB: at 09:51

## 2018-11-29 RX ADMIN — GABAPENTIN SCH MG: 100 CAPSULE ORAL at 12:59

## 2018-11-29 RX ADMIN — NICOTINE POLACRILEX PRN MG: 2 GUM, CHEWING ORAL at 17:39

## 2018-11-29 RX ADMIN — GABAPENTIN SCH MG: 100 CAPSULE ORAL at 06:26

## 2018-11-29 RX ADMIN — Medication SCH EACH: at 22:00

## 2018-11-29 RX ADMIN — Medication SCH MG: at 21:29

## 2018-11-29 RX ADMIN — SUVOREXANT PRN MG: 10 TABLET, FILM COATED ORAL at 21:29

## 2018-11-29 RX ADMIN — BUPRENORPHINE HYDROCHLORIDE, NALOXONE HYDROCHLORIDE SCH EACH: 8; 2 FILM, SOLUBLE BUCCAL; SUBLINGUAL at 17:38

## 2018-11-29 RX ADMIN — NICOTINE POLACRILEX PRN MG: 2 GUM, CHEWING ORAL at 21:29

## 2018-11-29 RX ADMIN — LIDOCAINE SCH: 50 PATCH TOPICAL at 09:52

## 2018-11-29 RX ADMIN — NICOTINE POLACRILEX PRN MG: 2 GUM, CHEWING ORAL at 09:52

## 2018-11-29 RX ADMIN — BUPRENORPHINE HYDROCHLORIDE, NALOXONE HYDROCHLORIDE SCH EACH: 8; 2 FILM, SOLUBLE BUCCAL; SUBLINGUAL at 06:25

## 2018-11-29 RX ADMIN — GABAPENTIN SCH MG: 100 CAPSULE ORAL at 21:29

## 2018-11-30 RX ADMIN — SUVOREXANT PRN MG: 10 TABLET, FILM COATED ORAL at 21:29

## 2018-11-30 RX ADMIN — LIDOCAINE SCH: 50 PATCH TOPICAL at 10:02

## 2018-11-30 RX ADMIN — NICOTINE POLACRILEX PRN MG: 2 GUM, CHEWING ORAL at 21:29

## 2018-11-30 RX ADMIN — GABAPENTIN SCH MG: 100 CAPSULE ORAL at 06:05

## 2018-11-30 RX ADMIN — BUPRENORPHINE HYDROCHLORIDE, NALOXONE HYDROCHLORIDE SCH EACH: 8; 2 FILM, SOLUBLE BUCCAL; SUBLINGUAL at 06:05

## 2018-11-30 RX ADMIN — GABAPENTIN SCH MG: 100 CAPSULE ORAL at 14:20

## 2018-11-30 RX ADMIN — Medication SCH MG: at 21:29

## 2018-11-30 RX ADMIN — GABAPENTIN SCH MG: 100 CAPSULE ORAL at 21:29

## 2018-11-30 RX ADMIN — Medication SCH EACH: at 21:30

## 2018-11-30 RX ADMIN — Medication SCH TAB: at 10:01

## 2018-11-30 RX ADMIN — NICOTINE POLACRILEX PRN MG: 2 GUM, CHEWING ORAL at 10:02

## 2018-11-30 RX ADMIN — NICOTINE PRN MG: 14 PATCH, EXTENDED RELEASE TRANSDERMAL at 10:01

## 2018-11-30 RX ADMIN — NICOTINE POLACRILEX PRN MG: 2 GUM, CHEWING ORAL at 18:11

## 2018-11-30 RX ADMIN — BUPRENORPHINE HYDROCHLORIDE, NALOXONE HYDROCHLORIDE SCH EACH: 8; 2 FILM, SOLUBLE BUCCAL; SUBLINGUAL at 17:47

## 2018-11-30 NOTE — PN
BHS Progress Note


Note: 





Psychiatric nurse practitoner note:


Belsomra 10mg renewed X3 days. Verbal consent given.

## 2018-12-01 RX ADMIN — Medication SCH TAB: at 09:47

## 2018-12-01 RX ADMIN — NICOTINE PRN MG: 14 PATCH, EXTENDED RELEASE TRANSDERMAL at 09:48

## 2018-12-01 RX ADMIN — NICOTINE POLACRILEX PRN MG: 2 GUM, CHEWING ORAL at 08:40

## 2018-12-01 RX ADMIN — BUPRENORPHINE HYDROCHLORIDE, NALOXONE HYDROCHLORIDE SCH EACH: 8; 2 FILM, SOLUBLE BUCCAL; SUBLINGUAL at 06:14

## 2018-12-01 RX ADMIN — Medication PRN MG: at 21:35

## 2018-12-01 RX ADMIN — NICOTINE POLACRILEX PRN MG: 2 GUM, CHEWING ORAL at 21:34

## 2018-12-01 RX ADMIN — BUPRENORPHINE HYDROCHLORIDE, NALOXONE HYDROCHLORIDE SCH EACH: 8; 2 FILM, SOLUBLE BUCCAL; SUBLINGUAL at 17:35

## 2018-12-01 RX ADMIN — Medication SCH MG: at 21:33

## 2018-12-01 RX ADMIN — GABAPENTIN SCH MG: 100 CAPSULE ORAL at 14:04

## 2018-12-01 RX ADMIN — NICOTINE POLACRILEX PRN MG: 2 GUM, CHEWING ORAL at 14:04

## 2018-12-01 RX ADMIN — SUVOREXANT PRN MG: 10 TABLET, FILM COATED ORAL at 21:35

## 2018-12-01 RX ADMIN — Medication SCH: at 21:33

## 2018-12-01 RX ADMIN — LIDOCAINE SCH: 50 PATCH TOPICAL at 09:47

## 2018-12-01 RX ADMIN — GABAPENTIN SCH MG: 100 CAPSULE ORAL at 06:14

## 2018-12-01 RX ADMIN — GABAPENTIN SCH MG: 100 CAPSULE ORAL at 21:35

## 2018-12-02 RX ADMIN — BUPRENORPHINE HYDROCHLORIDE, NALOXONE HYDROCHLORIDE SCH EACH: 8; 2 FILM, SOLUBLE BUCCAL; SUBLINGUAL at 06:12

## 2018-12-02 RX ADMIN — NICOTINE POLACRILEX PRN MG: 2 GUM, CHEWING ORAL at 09:58

## 2018-12-02 RX ADMIN — Medication PRN MG: at 21:15

## 2018-12-02 RX ADMIN — NICOTINE PRN MG: 14 PATCH, EXTENDED RELEASE TRANSDERMAL at 09:57

## 2018-12-02 RX ADMIN — Medication SCH MG: at 21:15

## 2018-12-02 RX ADMIN — NICOTINE POLACRILEX PRN MG: 2 GUM, CHEWING ORAL at 17:29

## 2018-12-02 RX ADMIN — NICOTINE POLACRILEX PRN MG: 2 GUM, CHEWING ORAL at 21:19

## 2018-12-02 RX ADMIN — LIDOCAINE SCH: 50 PATCH TOPICAL at 09:57

## 2018-12-02 RX ADMIN — Medication SCH: at 22:23

## 2018-12-02 RX ADMIN — GABAPENTIN SCH MG: 100 CAPSULE ORAL at 06:12

## 2018-12-02 RX ADMIN — NICOTINE POLACRILEX PRN MG: 2 GUM, CHEWING ORAL at 13:30

## 2018-12-02 RX ADMIN — SUVOREXANT PRN MG: 10 TABLET, FILM COATED ORAL at 21:17

## 2018-12-02 RX ADMIN — BUPRENORPHINE HYDROCHLORIDE, NALOXONE HYDROCHLORIDE SCH EACH: 8; 2 FILM, SOLUBLE BUCCAL; SUBLINGUAL at 17:28

## 2018-12-02 RX ADMIN — GABAPENTIN SCH MG: 100 CAPSULE ORAL at 21:15

## 2018-12-02 RX ADMIN — GABAPENTIN SCH MG: 100 CAPSULE ORAL at 13:29

## 2018-12-02 RX ADMIN — Medication SCH TAB: at 09:57

## 2018-12-03 RX ADMIN — GABAPENTIN SCH MG: 100 CAPSULE ORAL at 21:35

## 2018-12-03 RX ADMIN — Medication SCH TAB: at 10:37

## 2018-12-03 RX ADMIN — BUPRENORPHINE HYDROCHLORIDE, NALOXONE HYDROCHLORIDE SCH EACH: 8; 2 FILM, SOLUBLE BUCCAL; SUBLINGUAL at 06:03

## 2018-12-03 RX ADMIN — NICOTINE POLACRILEX PRN MG: 2 GUM, CHEWING ORAL at 21:36

## 2018-12-03 RX ADMIN — GABAPENTIN SCH MG: 100 CAPSULE ORAL at 06:03

## 2018-12-03 RX ADMIN — NICOTINE PRN MG: 14 PATCH, EXTENDED RELEASE TRANSDERMAL at 10:37

## 2018-12-03 RX ADMIN — SUVOREXANT PRN MG: 10 TABLET, FILM COATED ORAL at 21:35

## 2018-12-03 RX ADMIN — BUPRENORPHINE HYDROCHLORIDE, NALOXONE HYDROCHLORIDE SCH EACH: 8; 2 FILM, SOLUBLE BUCCAL; SUBLINGUAL at 17:05

## 2018-12-03 RX ADMIN — NICOTINE POLACRILEX PRN MG: 2 GUM, CHEWING ORAL at 10:37

## 2018-12-03 RX ADMIN — Medication SCH MG: at 21:35

## 2018-12-03 RX ADMIN — GABAPENTIN SCH MG: 100 CAPSULE ORAL at 14:11

## 2018-12-03 RX ADMIN — Medication PRN MG: at 21:35

## 2018-12-03 RX ADMIN — LIDOCAINE SCH: 50 PATCH TOPICAL at 11:14

## 2018-12-03 RX ADMIN — NICOTINE POLACRILEX PRN MG: 2 GUM, CHEWING ORAL at 14:11

## 2018-12-03 NOTE — PN
Psychiatric Progress Note


Vital Signs: 


 Vital Signs











 Period  Temp  Pulse  Resp  BP Sys/Knight  Pulse Ox


 


 Last 24 Hr  97.8 F  62  18-18  115/62  











Date of Session: 12/03/18


Chief Complaint:: Discharge Note


HPI: Patient addressing Alcohol, Opioid, Cocaine and Cannabis Dependence 

comorbid with Nicotine Dependence, Substance-Induced Mood Disorder and Substance

-Induced Sleep Disorder


ROS: Hep C, H/O Seizure


Current Medications: 


Active Medications











Generic Name Dose Route Start Last Admin





  Trade Name Freq  PRN Reason Stop Dose Admin


 


Acetaminophen  650 mg  11/20/18 15:14  11/29/18 12:59





  Tylenol -  PO   650 mg





  Q4H PRN   Administration





  FEVER   





     





     





     


 


Al Hydroxide/Mg Hydroxide  30 ml  11/20/18 15:14  





  Mylanta Oral Suspension -  PO   





  Q6H PRN   





  DYSPEPSIA   





     





     





     


 


Buprenorphine/Naloxone  1 each  11/28/18 18:00  12/03/18 06:03





  Suboxone 8mg/2mg Sl Film -  SL  12/05/18 17:59  1 each





  BID@0600,1800 MISTY   Administration





     





     





     





     


 


Eucalyptus/Menthol/Phenol/Sorbitol  1 each  11/20/18 15:14  11/26/18 22:08





  Cepastat Lozenge -  MM   1 each





  Q4H PRN   Administration





  SORE THROAT   





     





     





     


 


Gabapentin  100 mg  11/20/18 22:00  12/03/18 06:03





  Neurontin -  PO   100 mg





  TID MISTY   Administration





     





     





     





     


 


Guaifenesin  10 ml  11/20/18 15:14  





  Robitussin Dm -  PO   





  Q6H PRN   





  COUGH   





     





     





     


 


Ibuprofen  400 mg  11/20/18 15:14  





  Motrin -  PO   





  Q6H PRN   





  Pain Level 4-6   





     





     





     


 


Lidocaine  1 patch  11/21/18 10:00  12/02/18 09:57





  Lidoderm Patch -  TP   Not Given





  DAILY MISTY   





     





     





     





     


 


Loperamide HCl  4 mg  11/20/18 15:14  





  Imodium -  PO   





  Q6H PRN   





  DIARRHEA   





     





     





     


 


Magnesium Citrate  300 ml  11/20/18 15:14  





  Citroma -  PO   





  Q48H PRN   





  CONSTIPATION   





     





     





     


 


Magnesium Hydroxide  30 ml  11/20/18 15:14  





  Milk Of Magnesia -  PO   





  DAILY PRN   





  CONSTIPATION   





     





     





     


 


Melatonin  5 mg  11/20/18 22:00  12/02/18 21:15





  Melatonin  PO   5 mg





  HS PRN   Administration





  INSOMNIA   





     





     





     


 


Methyl Salicylate  1 applic  11/20/18 15:24  





  Peter-Mario -  TP   





  BID PRN   





  BACK PAIN   





     





     





     


 


Miscellaneous  1 each  11/21/18 22:00  12/02/18 22:23





  Lidoderm Patch Removal  MC   Not Given





  DAILY@2200 MISTY   





     





     





     





     


 


Nicotine  14 mg  11/20/18 17:00  12/03/18 10:37





  Nicoderm Patch -  TD   14 mg





  DAILY PRN   Administration





  WITHDRAWAL   





     





     





     


 


Nicotine Polacrilex  2 mg  11/20/18 15:14  12/03/18 10:37





  Nicorette Gum -  BUC   2 mg





  Q2H PRN   Administration





  NICOTINE REPLACEMENT RX   





     





     





     


 


Prenatal Multivit/Folic Acid/Iron  1 tab  11/21/18 10:00  12/03/18 10:37





  Prenatal Vitamins (Sjr) -  PO   1 tab





  DAILY MISTY   Administration





     





     





     





     


 


Pseudoephedrine/Triprolidine  1 combo  11/20/18 15:14  





  Actifed -  PO   





  TID PRN   





  NASAL CONGESTION   





     





     





     


 


Suvorexant  10 mg  11/30/18 22:00  12/02/18 21:17





  Belsomra  PO  12/03/18 21:59  10 mg





  HS PRN   Administration





  INSOMNIA   





     





     





     


 


Suvorexant  10 mg  12/03/18 22:00  





  Belsomra  PO   





  HS PRN   





  INSOMNIA   





     





     





     


 


Thiamine HCl  100 mg  11/20/18 22:00  12/02/18 21:15





  Vitamin B1 -  PO   100 mg





  HS MISTY   Administration





     





     





     





     











Current Side Effect: No


Lab tests ordered: Yes


Lab tests reviewed: Yes


Provider note:: Patient will complete this program on 12/4/18. He has met his 

treatment goals and will continue to addess his issues in outpatient treatment 

at [facility] at [address], [place] [address]. Told writer that from his participation in this program, he 

has learned to stay away from people, places and things. He responded well to 

Gabapentin 100 mg po TID. Script for that medication will be electronically 

transmitted to [facility] at [address] [place] [address]. He is stable 

for discharge on 12/4/18


Total face to face time:: 35





Mental Status Exam





- Mental Status Exam


Alert and Oriented to: Time, Place, Person


Cognitive Function: Fair


Patient Appearance: Well Groomed


Mood: Hopeful, Euthymic


Affect: Appropriate


Patient Behavior: Cooperative


Speech Pattern: Clear


Voice Loudness: Normal


Thought Process: Intact, Goal Oriented


Thought Disorder: Not Present


Hallucinations: Denies


Suicidal Ideation: Denies


Homicidal Ideation: Denies


Insight/Judgement: Fair


Sleep: Fair


Appetite: Good


Muscle strength/Tone: Normal


Gait/Station: Normal





Psychiatric Treatment Plan





- Problem List


(1) Alcohol dependence


Current Visit: Yes   Comment: .   





(2) Opioid dependence


Current Visit: Yes   Comment: .   





(3) Cocaine dependence


Current Visit: Yes   Comment: .   





(4) Cannabis dependence


Current Visit: Yes   Comment: .   





(5) Nicotine dependence


Current Visit: Yes   


Qualifiers: 


   Nicotine product type: cigarettes   Substance use status: in withdrawal   

Qualified Code(s): F17.213 - Nicotine dependence, cigarettes, with withdrawal   


Comment: .   





(6) Substance induced mood disorder


Current Visit: Yes   Comment: .   





(7) Substance-induced sleep disorder


Current Visit: Yes   





(8) History of seizure


Current Visit: No   





(9) Hepatitis C


Current Visit: No   


Qualifiers: 


   Viral hepatitis chronicity: chronic   Hepatic coma status: without hepatic 

coma   Qualified Code(s): B18.2 - Chronic viral hepatitis C   


Initial treatment plan: Patient will be discharged tomorrow and referred to 

[facility] for outpatient treatment

## 2018-12-03 NOTE — PN
BHS Progress Note


Note: 


PATIENT FOR DISCHARGE TOMORROW AND TO CONTINUE WITH MAT AT [facility] 

[facility] AT [address], [place] PATIENT 

SENT ONE WEEK OF SUBOXONE MEDICATION (8MG/2MG I STRIP SL BID )TO [facility] 

PHARMACY.

## 2018-12-04 VITALS — SYSTOLIC BLOOD PRESSURE: 129 MMHG | TEMPERATURE: 97.4 F | DIASTOLIC BLOOD PRESSURE: 74 MMHG | HEART RATE: 60 BPM

## 2018-12-04 RX ADMIN — NICOTINE POLACRILEX PRN MG: 2 GUM, CHEWING ORAL at 09:28

## 2018-12-04 RX ADMIN — LIDOCAINE SCH: 50 PATCH TOPICAL at 09:28

## 2018-12-04 RX ADMIN — NICOTINE POLACRILEX PRN MG: 2 GUM, CHEWING ORAL at 07:22

## 2018-12-04 RX ADMIN — GABAPENTIN SCH MG: 100 CAPSULE ORAL at 06:13

## 2018-12-04 RX ADMIN — BUPRENORPHINE HYDROCHLORIDE, NALOXONE HYDROCHLORIDE SCH EACH: 8; 2 FILM, SOLUBLE BUCCAL; SUBLINGUAL at 06:13

## 2018-12-04 RX ADMIN — Medication SCH: at 00:46

## 2018-12-04 RX ADMIN — Medication SCH TAB: at 09:28

## 2019-01-22 VITALS
OXYGEN SATURATION: 91 % | SYSTOLIC BLOOD PRESSURE: 119 MMHG | TEMPERATURE: 103 F | HEART RATE: 109 BPM | DIASTOLIC BLOOD PRESSURE: 69 MMHG | WEIGHT: 138.89 LBS | RESPIRATION RATE: 20 BRPM

## 2019-01-22 NOTE — ED ADULT TRIAGE NOTE - CHIEF COMPLAINT QUOTE
Pt complaining of left knee abscess. Pt states that he fell on a nail two days ago. Pt has puncture wound noted to knee cap with clear drainage. Sepsis code called in triage.

## 2019-01-23 ENCOUNTER — INPATIENT (INPATIENT)
Facility: HOSPITAL | Age: 49
LOS: 1 days | Discharge: HOME | DRG: 872 | End: 2019-01-25
Attending: INTERNAL MEDICINE | Admitting: INTERNAL MEDICINE
Payer: COMMERCIAL

## 2019-01-23 DIAGNOSIS — L03.90 CELLULITIS, UNSPECIFIED: ICD-10-CM

## 2019-01-23 DIAGNOSIS — Z29.9 ENCOUNTER FOR PROPHYLACTIC MEASURES, UNSPECIFIED: ICD-10-CM

## 2019-01-23 DIAGNOSIS — F10.10 ALCOHOL ABUSE, UNCOMPLICATED: ICD-10-CM

## 2019-01-23 DIAGNOSIS — B18.2 CHRONIC VIRAL HEPATITIS C: ICD-10-CM

## 2019-01-23 DIAGNOSIS — A41.9 SEPSIS, UNSPECIFIED ORGANISM: ICD-10-CM

## 2019-01-23 DIAGNOSIS — Z91.89 OTHER SPECIFIED PERSONAL RISK FACTORS, NOT ELSEWHERE CLASSIFIED: ICD-10-CM

## 2019-01-23 DIAGNOSIS — R63.8 OTHER SYMPTOMS AND SIGNS CONCERNING FOOD AND FLUID INTAKE: ICD-10-CM

## 2019-01-23 DIAGNOSIS — F19.10 OTHER PSYCHOACTIVE SUBSTANCE ABUSE, UNCOMPLICATED: ICD-10-CM

## 2019-01-23 DIAGNOSIS — Z59.0 HOMELESSNESS: ICD-10-CM

## 2019-01-23 LAB
ALBUMIN SERPL ELPH-MCNC: 2.7 G/DL — LOW (ref 3.4–5)
ALP SERPL-CCNC: 66 U/L — SIGNIFICANT CHANGE UP (ref 40–120)
ALT FLD-CCNC: 32 U/L — SIGNIFICANT CHANGE UP (ref 12–42)
ANION GAP SERPL CALC-SCNC: 8 MMOL/L — LOW (ref 9–16)
APPEARANCE UR: CLEAR — SIGNIFICANT CHANGE UP
APTT BLD: 30.4 SEC — SIGNIFICANT CHANGE UP (ref 27.5–36.3)
AST SERPL-CCNC: 36 U/L — SIGNIFICANT CHANGE UP (ref 15–37)
BASOPHILS NFR BLD AUTO: 0.3 % — SIGNIFICANT CHANGE UP (ref 0–2)
BILIRUB SERPL-MCNC: 0.3 MG/DL — SIGNIFICANT CHANGE UP (ref 0.2–1.2)
BILIRUB UR-MCNC: NEGATIVE — SIGNIFICANT CHANGE UP
BUN SERPL-MCNC: 10 MG/DL — SIGNIFICANT CHANGE UP (ref 7–23)
CALCIUM SERPL-MCNC: 8.6 MG/DL — SIGNIFICANT CHANGE UP (ref 8.5–10.5)
CHLORIDE SERPL-SCNC: 99 MMOL/L — SIGNIFICANT CHANGE UP (ref 96–108)
CO2 SERPL-SCNC: 30 MMOL/L — SIGNIFICANT CHANGE UP (ref 22–31)
COLOR SPEC: YELLOW — SIGNIFICANT CHANGE UP
CREAT SERPL-MCNC: 1.09 MG/DL — SIGNIFICANT CHANGE UP (ref 0.5–1.3)
CRP SERPL-MCNC: 8.9 MG/DL — HIGH (ref 0–0.9)
DIFF PNL FLD: NEGATIVE — SIGNIFICANT CHANGE UP
EOSINOPHIL NFR BLD AUTO: 0.1 % — SIGNIFICANT CHANGE UP (ref 0–6)
GLUCOSE SERPL-MCNC: 94 MG/DL — SIGNIFICANT CHANGE UP (ref 70–99)
GLUCOSE UR QL: NEGATIVE — SIGNIFICANT CHANGE UP
HCT VFR BLD CALC: 39.5 % — SIGNIFICANT CHANGE UP (ref 39–50)
HGB BLD-MCNC: 13.2 G/DL — SIGNIFICANT CHANGE UP (ref 13–17)
IMM GRANULOCYTES NFR BLD AUTO: 0.8 % — SIGNIFICANT CHANGE UP (ref 0–1.5)
INR BLD: 1.3 — HIGH (ref 0.88–1.16)
KETONES UR-MCNC: NEGATIVE — SIGNIFICANT CHANGE UP
LACTATE SERPL-SCNC: 1.4 MMOL/L — SIGNIFICANT CHANGE UP (ref 0.4–2)
LEUKOCYTE ESTERASE UR-ACNC: NEGATIVE — SIGNIFICANT CHANGE UP
LYMPHOCYTES # BLD AUTO: 13 % — SIGNIFICANT CHANGE UP (ref 13–44)
MCHC RBC-ENTMCNC: 28 PG — SIGNIFICANT CHANGE UP (ref 27–34)
MCHC RBC-ENTMCNC: 33.4 G/DL — SIGNIFICANT CHANGE UP (ref 32–36)
MCV RBC AUTO: 83.9 FL — SIGNIFICANT CHANGE UP (ref 80–100)
MONOCYTES NFR BLD AUTO: 4.2 % — SIGNIFICANT CHANGE UP (ref 2–14)
NEUTROPHILS NFR BLD AUTO: 81.6 % — HIGH (ref 43–77)
NITRITE UR-MCNC: NEGATIVE — SIGNIFICANT CHANGE UP
PCP SPEC-MCNC: SIGNIFICANT CHANGE UP
PH UR: 6 — SIGNIFICANT CHANGE UP (ref 5–8)
PLATELET # BLD AUTO: 299 K/UL — SIGNIFICANT CHANGE UP (ref 150–400)
POTASSIUM SERPL-MCNC: 3.1 MMOL/L — LOW (ref 3.5–5.3)
POTASSIUM SERPL-SCNC: 3.1 MMOL/L — LOW (ref 3.5–5.3)
PROT SERPL-MCNC: 7.3 G/DL — SIGNIFICANT CHANGE UP (ref 6.4–8.2)
PROT UR-MCNC: ABNORMAL MG/DL
PROTHROM AB SERPL-ACNC: 14.5 SEC — HIGH (ref 10–12.9)
RBC # BLD: 4.71 M/UL — SIGNIFICANT CHANGE UP (ref 4.2–5.8)
RBC # FLD: 13.3 % — SIGNIFICANT CHANGE UP (ref 10.3–14.5)
SODIUM SERPL-SCNC: 137 MMOL/L — SIGNIFICANT CHANGE UP (ref 132–145)
SP GR SPEC: 1.01 — SIGNIFICANT CHANGE UP (ref 1–1.03)
UROBILINOGEN FLD QL: 4 E.U./DL
WBC # BLD: 12 K/UL — HIGH (ref 3.8–10.5)
WBC # FLD AUTO: 12 K/UL — HIGH (ref 3.8–10.5)

## 2019-01-23 PROCEDURE — 73562 X-RAY EXAM OF KNEE 3: CPT | Mod: 26,LT

## 2019-01-23 PROCEDURE — 71045 X-RAY EXAM CHEST 1 VIEW: CPT | Mod: 26

## 2019-01-23 PROCEDURE — 93010 ELECTROCARDIOGRAM REPORT: CPT

## 2019-01-23 PROCEDURE — 99223 1ST HOSP IP/OBS HIGH 75: CPT | Mod: GC

## 2019-01-23 PROCEDURE — 99285 EMERGENCY DEPT VISIT HI MDM: CPT | Mod: 25

## 2019-01-23 PROCEDURE — 73562 X-RAY EXAM OF KNEE 3: CPT | Mod: 26,RT

## 2019-01-23 RX ORDER — VANCOMYCIN HCL 1 G
1500 VIAL (EA) INTRAVENOUS ONCE
Qty: 0 | Refills: 0 | Status: DISCONTINUED | OUTPATIENT
Start: 2019-01-23 | End: 2019-01-23

## 2019-01-23 RX ORDER — POTASSIUM CHLORIDE 20 MEQ
40 PACKET (EA) ORAL ONCE
Qty: 0 | Refills: 0 | Status: COMPLETED | OUTPATIENT
Start: 2019-01-23 | End: 2019-01-23

## 2019-01-23 RX ORDER — IPRATROPIUM/ALBUTEROL SULFATE 18-103MCG
3 AEROSOL WITH ADAPTER (GRAM) INHALATION ONCE
Qty: 0 | Refills: 0 | Status: COMPLETED | OUTPATIENT
Start: 2019-01-23 | End: 2019-01-23

## 2019-01-23 RX ORDER — VANCOMYCIN HCL 1 G
750 VIAL (EA) INTRAVENOUS EVERY 12 HOURS
Qty: 0 | Refills: 0 | Status: DISCONTINUED | OUTPATIENT
Start: 2019-01-23 | End: 2019-01-24

## 2019-01-23 RX ORDER — VANCOMYCIN HCL 1 G
1250 VIAL (EA) INTRAVENOUS ONCE
Qty: 0 | Refills: 0 | Status: COMPLETED | OUTPATIENT
Start: 2019-01-23 | End: 2019-01-23

## 2019-01-23 RX ORDER — HEPARIN SODIUM 5000 [USP'U]/ML
5000 INJECTION INTRAVENOUS; SUBCUTANEOUS EVERY 12 HOURS
Qty: 0 | Refills: 0 | Status: DISCONTINUED | OUTPATIENT
Start: 2019-01-23 | End: 2019-01-23

## 2019-01-23 RX ORDER — IPRATROPIUM/ALBUTEROL SULFATE 18-103MCG
3 AEROSOL WITH ADAPTER (GRAM) INHALATION
Qty: 0 | Refills: 0 | Status: COMPLETED | OUTPATIENT
Start: 2019-01-23 | End: 2019-01-23

## 2019-01-23 RX ORDER — POTASSIUM CHLORIDE 20 MEQ
40 PACKET (EA) ORAL EVERY 4 HOURS
Qty: 0 | Refills: 0 | Status: COMPLETED | OUTPATIENT
Start: 2019-01-23 | End: 2019-01-23

## 2019-01-23 RX ORDER — ACETAMINOPHEN 500 MG
975 TABLET ORAL ONCE
Qty: 0 | Refills: 0 | Status: COMPLETED | OUTPATIENT
Start: 2019-01-23 | End: 2019-01-23

## 2019-01-23 RX ORDER — SODIUM CHLORIDE 9 MG/ML
1950 INJECTION, SOLUTION INTRAVENOUS ONCE
Qty: 0 | Refills: 0 | Status: COMPLETED | OUTPATIENT
Start: 2019-01-23 | End: 2019-01-23

## 2019-01-23 RX ORDER — TETANUS TOXOID, REDUCED DIPHTHERIA TOXOID AND ACELLULAR PERTUSSIS VACCINE, ADSORBED 5; 2.5; 8; 8; 2.5 [IU]/.5ML; [IU]/.5ML; UG/.5ML; UG/.5ML; UG/.5ML
0.5 SUSPENSION INTRAMUSCULAR ONCE
Qty: 0 | Refills: 0 | Status: COMPLETED | OUTPATIENT
Start: 2019-01-23 | End: 2019-01-23

## 2019-01-23 RX ORDER — HYDROMORPHONE HYDROCHLORIDE 2 MG/ML
1 INJECTION INTRAMUSCULAR; INTRAVENOUS; SUBCUTANEOUS ONCE
Qty: 0 | Refills: 0 | Status: DISCONTINUED | OUTPATIENT
Start: 2019-01-23 | End: 2019-01-23

## 2019-01-23 RX ORDER — ACETAMINOPHEN 500 MG
650 TABLET ORAL EVERY 6 HOURS
Qty: 0 | Refills: 0 | Status: DISCONTINUED | OUTPATIENT
Start: 2019-01-23 | End: 2019-01-25

## 2019-01-23 RX ORDER — CEFTRIAXONE 500 MG/1
1 INJECTION, POWDER, FOR SOLUTION INTRAMUSCULAR; INTRAVENOUS ONCE
Qty: 0 | Refills: 0 | Status: COMPLETED | OUTPATIENT
Start: 2019-01-23 | End: 2019-01-23

## 2019-01-23 RX ORDER — HEPARIN SODIUM 5000 [USP'U]/ML
5000 INJECTION INTRAVENOUS; SUBCUTANEOUS EVERY 8 HOURS
Qty: 0 | Refills: 0 | Status: DISCONTINUED | OUTPATIENT
Start: 2019-01-23 | End: 2019-01-25

## 2019-01-23 RX ADMIN — HYDROMORPHONE HYDROCHLORIDE 1 MILLIGRAM(S): 2 INJECTION INTRAMUSCULAR; INTRAVENOUS; SUBCUTANEOUS at 07:56

## 2019-01-23 RX ADMIN — Medication 40 MILLIEQUIVALENT(S): at 10:04

## 2019-01-23 RX ADMIN — Medication 975 MILLIGRAM(S): at 00:30

## 2019-01-23 RX ADMIN — Medication 250 MILLIGRAM(S): at 12:43

## 2019-01-23 RX ADMIN — Medication 40 MILLIEQUIVALENT(S): at 02:01

## 2019-01-23 RX ADMIN — Medication 3 MILLILITER(S): at 01:01

## 2019-01-23 RX ADMIN — CEFTRIAXONE 100 GRAM(S): 500 INJECTION, POWDER, FOR SOLUTION INTRAMUSCULAR; INTRAVENOUS at 00:00

## 2019-01-23 RX ADMIN — Medication 166.67 MILLIGRAM(S): at 01:14

## 2019-01-23 RX ADMIN — SODIUM CHLORIDE 1950 MILLILITER(S): 9 INJECTION, SOLUTION INTRAVENOUS at 00:00

## 2019-01-23 RX ADMIN — Medication 40 MILLIEQUIVALENT(S): at 13:19

## 2019-01-23 RX ADMIN — Medication 975 MILLIGRAM(S): at 00:00

## 2019-01-23 RX ADMIN — Medication 40 MILLIEQUIVALENT(S): at 07:57

## 2019-01-23 SDOH — ECONOMIC STABILITY - HOUSING INSECURITY: HOMELESSNESS: Z59.0

## 2019-01-23 NOTE — PATIENT PROFILE ADULT - ABILITY TO HEAR (WITH HEARING AID OR HEARING APPLIANCE IF NORMALLY USED):
Adequate: hears normal conversation without difficulty unable to perform complete skin assessments, due to pt's refusal to take off clothes and pt refused to put on hospital gown/Adequate: hears normal conversation without difficulty

## 2019-01-23 NOTE — ED ADULT NURSE REASSESSMENT NOTE - NS ED NURSE REASSESS COMMENT FT1
pt agitated at time of transfer stating that he's "dope sick". Dr. Pedraza at bedside. Dilaudid 1mg ordered. pt now agreed to [place] for [place].

## 2019-01-23 NOTE — ED PROVIDER NOTE - PROGRESS NOTE DETAILS
VSS- patient agreeable to admission. Accepted by Dr. Delcid. Would benefit from Echo to eval murmur and for possible endocarditis.

## 2019-01-23 NOTE — H&P ADULT - PROBLEM SELECTOR PLAN 8
1) PCP Contacted on Admission: (Y/N) --> Name & Phone #:  None. Will require f/u upon d/c  2) Date of Contact with PCP:   3) PCP Contacted at Discharge: (Y/N)   4) Summary of Handoff Given to PCP:   5) Post-Discharge Appointment Date and Location:

## 2019-01-23 NOTE — ED PROVIDER NOTE - PMH
Alcohol abuse    Drug abuse    Hep C w/o coma, chronic    Heroin abuse    Homelessness    IV drug abuse

## 2019-01-23 NOTE — H&P ADULT - PROBLEM SELECTOR PLAN 1
Patient presenting w/ sepsis. Reperfusion assessment completed w/ cap refill <2 sec. CXR negative and UA clean. Sepsis likely 2/2 to cellulitis and abscess. Lactate 1.4. WBC 12.0 w/ PMN 81.6%  s/p 1.9L, Vanc and Rocephin  -f/u BC x2,  wound culture, urine culture  -Tylenol prn for fever  -Will continue vanc 750mg q12hr IV and Rocephin 1g q day at this time. Given lack of risk factors-no recent IV abx use and patient unable to state how recent hospitalization was, no need for pseudomonal coverage at this time.   -pending echo given IVDA to evaluate for murmur and vegetations to r/o endocarditis Patient presenting w/ sepsis. Reperfusion assessment completed w/ cap refill <2 sec. CXR negative and UA clean. Sepsis likely 2/2 to cellulitis and abscess. Lactate 1.4. WBC 12.0 w/ PMN 81.6%  s/p 1.9L, Vanc and Rocephin  -f/u BC x2,  wound culture, urine culture  -Tylenol prn for fever  -Will continue vanc 750mg q12hr IV and Rocephin 1g q day at this time. Given lack of risk factors-no recent IV abx use and patient unable to state how recent hospitalization was, no need for pseudomonal coverage at this time.   -pending echo given IVDA to evaluate for murmur and vegetations to r/o endocarditis  -Wound care for knee

## 2019-01-23 NOTE — H&P ADULT - ASSESSMENT
49yo un-domeciled M w/ PMH EtOH abuse, cocaine and IV heroin use, hepatitis C who presented for evaluation of pain, redness, drainage, and swelling to the R knee. 49yo un-domeciled M w/ PMH EtOH abuse, cocaine and IV heroin use, hepatitis C who presented for evaluation of pain, redness, drainage, and swelling to the L knee.

## 2019-01-23 NOTE — ED PROVIDER NOTE - OBJECTIVE STATEMENT
48 M presenting with pain, oozing pus and swelling to the R knee. He fell and hit it on a leno nail a few days ago. No fevers but is febrile at triage. Patient is homeless, IVDU with heroin, drinks EtOH and smokes. Also has active Hep C. Recently tested neg for HIV. last inpatient a few mos ago but can't recall why. never told that he had a heart murmur. No shortness of breath, no hx of asthma or inhaler use.

## 2019-01-23 NOTE — ED PROVIDER NOTE - MUSCULOSKELETAL, MLM
Spine appears normal, range of motion is not limited, no muscle or joint tenderness. No pain with R knee ROM.

## 2019-01-23 NOTE — ED PROVIDER NOTE - DIAGNOSTIC INTERPRETATION
Interpreted by ED Physician:  CXR (1 view): no acute abnormality: no infiltrates, bones appear intact, cardiac silhouette wnl   Interpreted by ED Physician:  Knee xray (3 view)- no fx, no jt effusion, no soft tissue swelling

## 2019-01-23 NOTE — H&P ADULT - HISTORY OF PRESENT ILLNESS
In the ED:  ICU Vital Signs Last 24 Hrs  T(C): 37.1 (23 Jan 2019 07:56), Max: 39.3 (22 Jan 2019 23:51)  T(F): 98.7 (23 Jan 2019 07:56), Max: 102.7 (22 Jan 2019 23:51)  HR: 65 (23 Jan 2019 07:56) (64 - 109)  BP: 126/67 (23 Jan 2019 07:56) (110/56 - 126/67)  BP(mean): --  ABP: --  ABP(mean): --  RR: 18 (23 Jan 2019 07:56) (18 - 20)  SpO2: 97% (23 Jan 2019 07:56) (91% - 97%) 49yo undomeciled M w/ PMH EtOH abuse, cocaine and IV heroin use, hepatitis C who presented for evaluation of pain, redness, drainage, and swelling to the R knee. Patient states he was drunk 2 days ago, tripped, and fell onto his knee.  Patient has had redness, pain, and swelling to the area with some drainage. He denies LOC or any other trauma at the time. Otherwise: no fevers, chills, nausea, vomiting, diarrhea. Patient able to ambulate with pain.  He states his last drink was 2 days ago in the evening (unable to state what time exactly); he drinks 2 pints of vodka daily. He last smoked cocaine 2 days ago. He also uses a bundle of heroin daily; last use was last night prior to going to [facility]. It is unclear if patient has had withdrawal in the past.     Patient does not know if he has a heart murmur or not. He further reports having been in a hospital recently though unable to provide location or timing of hospitalization. No recent antibiotic use.   He recently tested negative for HIV.     In [facility]: T 102.7  /69 RR20 O2 Sat 91% on RA  WBC 12.0 w/ 81.6% PMN CRP 8.9 Lactate 1.4 K 3.1   s/p Duoneb, LR 1.9L, Vanc, Rocephin, Dilaudid 1mg IV, Tylenol 925mg PO K 80mEQ. Patient refused TDAP.   UA negative  Utox: (+) cocaine, opiates  CXR:  NAPD  R knee x-ray:no fracture, no joint effusion  Pending BCx2 and wound culture 49yo undomeciled M w/ PMH EtOH abuse, cocaine and IV heroin use, hepatitis C who presented for evaluation of pain, redness, drainage, and swelling to the R knee. Patient states he was drunk 2 days ago, tripped, and fell onto his knee.  Patient has had redness, pain, and swelling to the area with some drainage since then. He denies LOC or any other trauma at the time of the fall. Otherwise: no fevers, chills, nausea, vomiting, diarrhea. Patient able to ambulate with pain.  He states his last drink was 2 days ago in the evening (unable to state what time exactly); he drinks 2 pints of vodka daily. He last smoked cocaine 2 days ago. He also uses a bundle of heroin daily; last use was last night prior to going to [facility]. It is unclear if patient has had withdrawal in the past.     Patient does not know if he has a heart murmur or not. He further reports having been in a hospital recently though unable to provide location or timing of hospitalization. No recent antibiotic use.   He recently tested negative for HIV.     In [facility]: T 102.7  /69 RR20 O2 Sat 91% on RA  WBC 12.0 w/ 81.6% PMN CRP 8.9 Lactate 1.4 K 3.1   s/p Duoneb, LR 1.9L, Vanc, Rocephin, Dilaudid 1mg IV, Tylenol 925mg PO K 80mEQ. Patient refused TDAP.   UA negative  Utox: (+) cocaine, opiates  CXR:  NAPD  R knee x-ray:no fracture, no joint effusion  Pending BCx2 and wound culture 47yo undomeciled M w/ PMH EtOH abuse, cocaine and IV heroin use, hepatitis C who presented for evaluation of pain, redness, drainage, and swelling to the R knee. Patient states he was drunk 3 days ago, tripped, and fell onto his knee.  Patient has had redness, pain, and swelling to the area with some drainage since then. He denies LOC or any other trauma at the time of the fall. Otherwise: no fevers, chills, nausea, vomiting, diarrhea. Patient able to ambulate with pain.  He states his last drink was 2 days ago in the evening (unable to state what time exactly); he drinks 2 pints of vodka daily. He last smoked cocaine 2 days ago. He also uses a bundle of heroin daily; last use was last night prior to going to [facility]. It is unclear if patient has had withdrawal in the past.     Patient does not know if he has a heart murmur or not. He further reports having been in a hospital recently though unable to provide location or timing of hospitalization. No recent antibiotic use.   He recently tested negative for HIV.     In [facility]: T 102.7  /69 RR20 O2 Sat 91% on RA  WBC 12.0 w/ 81.6% PMN CRP 8.9 Lactate 1.4 K 3.1   s/p Duoneb, LR 1.9L, Vanc, Rocephin, Dilaudid 1mg IV, Tylenol 925mg PO K 80mEQ. Patient refused TDAP.   UA negative  Utox: (+) cocaine, opiates  CXR:  NAPD  R knee x-ray:no fracture, no joint effusion  Pending BCx2 and wound culture 49yo undomeciled M w/ PMH EtOH abuse, cocaine and IV heroin use, hepatitis C who presented for evaluation of pain, redness, drainage, and swelling to the L knee. Patient states he was drunk 3 days ago, tripped, and fell onto his knee.  Patient has had redness, pain, and swelling to the area with some drainage since then. He denies LOC or any other trauma at the time of the fall. Otherwise: no fevers, chills, nausea, vomiting, diarrhea. Patient able to ambulate with pain.  He states his last drink was 2 days ago in the evening (unable to state what time exactly); he drinks 2 pints of vodka daily. He last smoked cocaine 2 days ago. He also uses a bundle of heroin daily; last use was last night prior to going to [facility]. It is unclear if patient has had withdrawal in the past.     Patient does not know if he has a heart murmur or not. He further reports having been in a hospital recently though unable to provide location or timing of hospitalization. No recent antibiotic use.   He recently tested negative for HIV.     In [facility]: T 102.7  /69 RR20 O2 Sat 91% on RA  WBC 12.0 w/ 81.6% PMN CRP 8.9 Lactate 1.4 K 3.1   s/p Duoneb, LR 1.9L, Vanc, Rocephin, Dilaudid 1mg IV, Tylenol 925mg PO K 80mEQ. Patient refused TDAP.   UA negative  Utox: (+) cocaine, opiates  CXR:  NAPD  R knee x-ray:no fracture, no joint effusion  Pending BCx2 and wound culture

## 2019-01-23 NOTE — ED PROVIDER NOTE - SKIN, MLM
+ self draining R knee abscess, pus expressed, sent for cx and flushed out with saline. ~ 3-4 cm collar of cellulitis

## 2019-01-23 NOTE — H&P ADULT - PROBLEM SELECTOR PLAN 2
Patient w/ erythema, swelling, and pain to the R knee w/ sanguinous drainage. s/p Vanc and rocephin.   -See above Patient w/ erythema, swelling, and pain to the L knee w/ sanguinous drainage. s/p Vanc and rocephin.   -See above

## 2019-01-23 NOTE — H&P ADULT - NSHPREVIEWOFSYSTEMS_GEN_ALL_CORE
REVIEW OF SYSTEMS:    CONSTITUTIONAL: No weakness, fevers or chills  EYES/ENT: No visual changes;  No vertigo or throat pain   NECK: No pain or stiffness  RESPIRATORY: No cough, wheezing, hemoptysis; No shortness of breath  CARDIOVASCULAR: No chest pain or palpitations  GASTROINTESTINAL: No abdominal or epigastric pain. No nausea, vomiting, or hematemesis; No diarrhea or constipation. No melena or hematochezia.  GENITOURINARY: No dysuria, frequency or hematuria  PSYCH: normal affect and interaction  EXTREMITIES: no extremities weakness or rashes  NEUROLOGICAL: No numbness or weakness  SKIN: No itching, burning, rashes, or lesions   All other review of systems is negative unless indicated above.

## 2019-01-23 NOTE — H&P ADULT - ATTENDING COMMENTS
Pt seen and examined by me at bedside earlier with housestaff. Agree with H&P above with additions,  VS noted, exam as above with additions,   stated fell and injured L knee 2days ago, able to move L knee, +drainage on L anterior knee, low suspicion for septic knee.   labs reviewed   cellulitis/abscess of L knee- c/w vanco IV, check vanco trough, f/u bcx, f/u wound cx  IVDU-use of heroin 2 days ago, not currently in w/d; can use ativan prn, reglan sxs control,   rest of a/p as above

## 2019-01-23 NOTE — H&P ADULT - NSHPPHYSICALEXAM_GEN_ALL_CORE
.  VITAL SIGNS:  T(C): 37.1 (01-23-19 @ 07:56), Max: 39.3 (01-22-19 @ 23:51)  T(F): 98.7 (01-23-19 @ 07:56), Max: 102.7 (01-22-19 @ 23:51)  HR: 65 (01-23-19 @ 07:56) (64 - 109)  BP: 126/67 (01-23-19 @ 07:56) (110/56 - 126/67)  BP(mean): --  RR: 18 (01-23-19 @ 07:56) (18 - 20)  SpO2: 97% (01-23-19 @ 07:56) (91% - 97%)  Wt(kg): --    PHYSICAL EXAM:    Constitutional: WDWN resting comfortably in bed; NAD  Head: NC/AT  Eyes: PERRL, EOMI, anicteric sclera  ENT: no nasal discharge; uvula midline, no oropharyngeal erythema or exudates; MMM  Neck: supple; no JVD or thyromegaly  Respiratory: CTA B/L; no W/R/R, no retractions  Cardiac: +S1/S2; RRR; no M/R/G; PMI non-displaced  Gastrointestinal: soft, NT/ND; no rebound or guarding; +BSx4  Genitourinary: normal external genitalia  Back: spine midline, no bony tenderness or step-offs; no CVAT B/L  Extremities: WWP, no clubbing or cyanosis; no peripheral edema  Musculoskeletal: NROM x4; no joint swelling, tenderness or erythema  Vascular: 2+ radial, femoral, DP/PT pulses B/L  Dermatologic: skin warm, dry and intact; no rashes, wounds, or scars  Lymphatic: no submandibular or cervical LAD  Neurologic: AAOx3; CNII-XII grossly intact; no focal deficits  Psychiatric: affect and characteristics of appearance, verbalizations, behaviors are appropriate .  VITAL SIGNS:  T(C): 37.2 (01-23-19 @ 09:15), Max: 39.3 (01-22-19 @ 23:51)  T(F): 98.9 (01-23-19 @ 09:15), Max: 102.7 (01-22-19 @ 23:51)  HR: 68 (01-23-19 @ 09:15) (64 - 109)  BP: 123/69 (01-23-19 @ 09:15) (110/56 - 126/67)  BP(mean): --  RR: 18 (01-23-19 @ 09:15) (18 - 20)  SpO2: 96% (01-23-19 @ 09:15) (91% - 97%)  Wt(kg): --    PHYSICAL EXAM:    General: resting comfortably in bed; in no acute distress, malodorous and unkempt. Exam limited by patient cooperation, actively cough  Head: normocephalic, atraumatic   Eyes: PERRL, EOMI, anicteric sclera  Respiratory: clear to auscultation bilaterally; no wheezing, no rhonchi, no rales, no retractions  Cardiac: +S1/S2; regular rate and rhythm; (+) holosystolic murmur at apex, no rub, no gallop  Extremities: warm, no clubbing or cyanosis; no peripheral edema  Musculoskeletal: full ROM; (+) R knee w/ 2inch x 2inch area of erythema and puncture wound at the center with thick sanguinous drainage, non-purulent, non-foul smelling. (+) swelling, (-) fluctuance, (-) effusion. Full ROM actively and passively. No other injury noted.   Vascular: 2+ radial  pulses bilaterally  Dermatologic: skin warm, dry and intact; no rashes. Puncture wound to the R knee  Neurologic: AAOx3; CNII-XII grossly intact; no focal deficits  Psychiatric: not cooperative and avoids eye contact .  VITAL SIGNS:  T(C): 37.2 (01-23-19 @ 09:15), Max: 39.3 (01-22-19 @ 23:51)  T(F): 98.9 (01-23-19 @ 09:15), Max: 102.7 (01-22-19 @ 23:51)  HR: 68 (01-23-19 @ 09:15) (64 - 109)  BP: 123/69 (01-23-19 @ 09:15) (110/56 - 126/67)  BP(mean): --  RR: 18 (01-23-19 @ 09:15) (18 - 20)  SpO2: 96% (01-23-19 @ 09:15) (91% - 97%)  Wt(kg): --    PHYSICAL EXAM:    General: resting comfortably in bed; in no acute distress, malodorous and unkempt. Exam limited by patient cooperation, actively cough  Head: normocephalic, atraumatic   Eyes: PERRL, EOMI, anicteric sclera  Respiratory: clear to auscultation bilaterally; no wheezing, no rhonchi, no rales, no retractions  Cardiac: +S1/S2; regular rate and rhythm; (+) holosystolic murmur at apex, no rub, no gallop  Extremities: warm, no clubbing or cyanosis; no peripheral edema  Musculoskeletal: full ROM; (+) L knee w/ 2inch x 2inch area of erythema and puncture wound at the center with thick sanguinous drainage, non-purulent, non-foul smelling. (+) swelling, (-) fluctuance, (-) effusion. Full ROM actively and passively. No other injury noted.   Vascular: 2+ radial  pulses bilaterally  Dermatologic: skin warm, dry and intact; no rashes.  Neurologic: AAOx3; CNII-XII grossly intact; no focal deficits  Psychiatric: not cooperative and avoids eye contact

## 2019-01-23 NOTE — H&P ADULT - NSHPLABSRESULTS_GEN_ALL_CORE
.  LABS:                         13.2   12.0  )-----------( 299      ( 2019 00:14 )             39.5         137  |  99  |  10  ----------------------------<  94  3.1<L>   |  30  |  1.09    Ca    8.6      2019 00:14    TPro  7.3  /  Alb  2.7<L>  /  TBili  0.3  /  DBili  x   /  AST  36  /  ALT  32  /  AlkPhos  66      PT/INR - ( 2019 00:14 )   PT: 14.5 sec;   INR: 1.30          PTT - ( 2019 00:14 )  PTT:30.4 sec  Urinalysis Basic - ( 2019 03:04 )    Color: Yellow / Appearance: Clear / S.010 / pH: x  Gluc: x / Ketone: NEGATIVE  / Bili: NEGATIVE / Urobili: 4.0 E.U./dL   Blood: x / Protein: Trace mg/dL / Nitrite: NEGATIVE   Leuk Esterase: NEGATIVE / RBC: < 5 /HPF / WBC 5-10 /HPF   Sq Epi: x / Non Sq Epi: 0-5 /HPF / Bacteria: Present /HPF            Lactate, Blood: 1.4 mmoL/L ( @ 00:14)      RADIOLOGY, EKG & ADDITIONAL TESTS: Reviewed. .  LABS:                         13.2   12.0  )-----------( 299      ( 2019 00:14 )             39.5         137  |  99  |  10  ----------------------------<  94  3.1<L>   |  30  |  1.09    Ca    8.6      2019 00:14    TPro  7.3  /  Alb  2.7<L>  /  TBili  0.3  /  DBili  x   /  AST  36  /  ALT  32  /  AlkPhos  66      PT/INR - ( 2019 00:14 )   PT: 14.5 sec;   INR: 1.30          PTT - ( 2019 00:14 )  PTT:30.4 sec  Urinalysis Basic - ( 2019 03:04 )    Color: Yellow / Appearance: Clear / S.010 / pH: x  Gluc: x / Ketone: NEGATIVE  / Bili: NEGATIVE / Urobili: 4.0 E.U./dL   Blood: x / Protein: Trace mg/dL / Nitrite: NEGATIVE   Leuk Esterase: NEGATIVE / RBC: < 5 /HPF / WBC 5-10 /HPF   Sq Epi: x / Non Sq Epi: 0-5 /HPF / Bacteria: Present /HPF              Lactate, Blood: 1.4 mmoL/L ( @ 00:14)      RADIOLOGY, EKG & ADDITIONAL TESTS: Reviewed.

## 2019-01-23 NOTE — H&P ADULT - PROBLEM SELECTOR PLAN 7
F: taking PO at this time  E: Replete prn, K>4, Mg>2  N: regular diet    DVT ppx: hep sq  GI ppx: none  Dispo: [facility]

## 2019-01-23 NOTE — H&P ADULT - NSHPSOCIALHISTORY_GEN_ALL_CORE
Smoke:  Drug:  Etoh: (+) smokes cigarettes daily, (+) smokes cocaine-last used 2 days ago, (+) IV heroin use-1 bundle daily, (+) EtOH - 2 pints of vodka daily. (+) homeless

## 2019-01-23 NOTE — ED ADULT NURSE NOTE - NSIMPLEMENTINTERV_GEN_ALL_ED
Implemented All Universal Safety Interventions:  [place] to call system. Call bell, personal items and telephone within reach. Instruct patient to call for assistance. Room bathroom lighting operational. Non-slip footwear when patient is off stretcher. Physically safe environment: no spills, clutter or unnecessary equipment. Stretcher in lowest position, wheels locked, appropriate side rails in place.

## 2019-01-23 NOTE — ED PROVIDER NOTE - MEDICAL DECISION MAKING DETAILS
Patient presenting with sepsis sx in the setting of knee abscess, possible new murmur, active IVDU and homelessness. Temp seems out of proportion to abscess and cellulitis. Will cover with CTX and Vanc. Plan to admit.

## 2019-01-23 NOTE — H&P ADULT - PROBLEM SELECTOR PLAN 4
Patient w/ hx of EtOH abuse. Drinks about 2 pints of vodka daily. Last drink was 2 days ago in the evening-unknown time. patient dose not report any history of withdrawal. Current CIWA 1 for mild anxiety.  -CIWA q8hr

## 2019-01-23 NOTE — ED ADULT NURSE REASSESSMENT NOTE - NS ED NURSE REASSESS COMMENT FT1
pt resting in bed. no signs of acute distress noted. pt updated on POC. pt verbalized understanding.

## 2019-01-24 LAB
ALBUMIN SERPL ELPH-MCNC: 2.9 G/DL — LOW (ref 3.3–5)
ALP SERPL-CCNC: 55 U/L — SIGNIFICANT CHANGE UP (ref 40–120)
ALT FLD-CCNC: 18 U/L — SIGNIFICANT CHANGE UP (ref 10–45)
ANION GAP SERPL CALC-SCNC: 11 MMOL/L — SIGNIFICANT CHANGE UP (ref 5–17)
AST SERPL-CCNC: 18 U/L — SIGNIFICANT CHANGE UP (ref 10–40)
BASOPHILS NFR BLD AUTO: 0.7 % — SIGNIFICANT CHANGE UP (ref 0–2)
BILIRUB SERPL-MCNC: 0.3 MG/DL — SIGNIFICANT CHANGE UP (ref 0.2–1.2)
BUN SERPL-MCNC: 7 MG/DL — SIGNIFICANT CHANGE UP (ref 7–23)
CALCIUM SERPL-MCNC: 8.6 MG/DL — SIGNIFICANT CHANGE UP (ref 8.4–10.5)
CHLORIDE SERPL-SCNC: 103 MMOL/L — SIGNIFICANT CHANGE UP (ref 96–108)
CO2 SERPL-SCNC: 26 MMOL/L — SIGNIFICANT CHANGE UP (ref 22–31)
CREAT SERPL-MCNC: 0.84 MG/DL — SIGNIFICANT CHANGE UP (ref 0.5–1.3)
CULTURE RESULTS: NO GROWTH — SIGNIFICANT CHANGE UP
EOSINOPHIL NFR BLD AUTO: 0.5 % — SIGNIFICANT CHANGE UP (ref 0–6)
ERYTHROCYTE [SEDIMENTATION RATE] IN BLOOD: 23 MM/HR — HIGH
GLUCOSE SERPL-MCNC: 86 MG/DL — SIGNIFICANT CHANGE UP (ref 70–99)
HCT VFR BLD CALC: 38 % — LOW (ref 39–50)
HGB BLD-MCNC: 12.4 G/DL — LOW (ref 13–17)
LYMPHOCYTES # BLD AUTO: 23.7 % — SIGNIFICANT CHANGE UP (ref 13–44)
MAGNESIUM SERPL-MCNC: 1.6 MG/DL — SIGNIFICANT CHANGE UP (ref 1.6–2.6)
MCHC RBC-ENTMCNC: 27.9 PG — SIGNIFICANT CHANGE UP (ref 27–34)
MCHC RBC-ENTMCNC: 32.6 G/DL — SIGNIFICANT CHANGE UP (ref 32–36)
MCV RBC AUTO: 85.6 FL — SIGNIFICANT CHANGE UP (ref 80–100)
MONOCYTES NFR BLD AUTO: 7 % — SIGNIFICANT CHANGE UP (ref 2–14)
NEUTROPHILS NFR BLD AUTO: 68.1 % — SIGNIFICANT CHANGE UP (ref 43–77)
PHOSPHATE SERPL-MCNC: 3.2 MG/DL — SIGNIFICANT CHANGE UP (ref 2.5–4.5)
PLATELET # BLD AUTO: 240 K/UL — SIGNIFICANT CHANGE UP (ref 150–400)
POTASSIUM SERPL-MCNC: 3.7 MMOL/L — SIGNIFICANT CHANGE UP (ref 3.5–5.3)
POTASSIUM SERPL-SCNC: 3.7 MMOL/L — SIGNIFICANT CHANGE UP (ref 3.5–5.3)
PROT SERPL-MCNC: 6.5 G/DL — SIGNIFICANT CHANGE UP (ref 6–8.3)
RBC # BLD: 4.44 M/UL — SIGNIFICANT CHANGE UP (ref 4.2–5.8)
RBC # FLD: 13.8 % — SIGNIFICANT CHANGE UP (ref 10.3–16.9)
SODIUM SERPL-SCNC: 140 MMOL/L — SIGNIFICANT CHANGE UP (ref 135–145)
SPECIMEN SOURCE: SIGNIFICANT CHANGE UP
VANCOMYCIN TROUGH SERPL-MCNC: 5.2 UG/ML — LOW (ref 10–20)
WBC # BLD: 7.6 K/UL — SIGNIFICANT CHANGE UP (ref 3.8–10.5)
WBC # FLD AUTO: 7.6 K/UL — SIGNIFICANT CHANGE UP (ref 3.8–10.5)

## 2019-01-24 PROCEDURE — 99233 SBSQ HOSP IP/OBS HIGH 50: CPT | Mod: GC

## 2019-01-24 RX ORDER — METHADONE HYDROCHLORIDE 40 MG/1
10 TABLET ORAL DAILY
Qty: 0 | Refills: 0 | Status: DISCONTINUED | OUTPATIENT
Start: 2019-01-24 | End: 2019-01-24

## 2019-01-24 RX ORDER — POTASSIUM CHLORIDE 20 MEQ
40 PACKET (EA) ORAL ONCE
Qty: 0 | Refills: 0 | Status: COMPLETED | OUTPATIENT
Start: 2019-01-24 | End: 2019-01-24

## 2019-01-24 RX ORDER — VANCOMYCIN HCL 1 G
1250 VIAL (EA) INTRAVENOUS EVERY 12 HOURS
Qty: 0 | Refills: 0 | Status: DISCONTINUED | OUTPATIENT
Start: 2019-01-24 | End: 2019-01-25

## 2019-01-24 RX ORDER — METHADONE HYDROCHLORIDE 40 MG/1
10 TABLET ORAL EVERY 24 HOURS
Qty: 0 | Refills: 0 | Status: DISCONTINUED | OUTPATIENT
Start: 2019-01-24 | End: 2019-01-25

## 2019-01-24 RX ORDER — MAGNESIUM SULFATE 500 MG/ML
2 VIAL (ML) INJECTION ONCE
Qty: 0 | Refills: 0 | Status: COMPLETED | OUTPATIENT
Start: 2019-01-24 | End: 2019-01-24

## 2019-01-24 RX ADMIN — Medication 250 MILLIGRAM(S): at 01:51

## 2019-01-24 RX ADMIN — Medication 40 MILLIEQUIVALENT(S): at 09:32

## 2019-01-24 RX ADMIN — METHADONE HYDROCHLORIDE 10 MILLIGRAM(S): 40 TABLET ORAL at 09:32

## 2019-01-24 RX ADMIN — Medication 250 MILLIGRAM(S): at 13:43

## 2019-01-24 RX ADMIN — Medication 50 GRAM(S): at 09:32

## 2019-01-24 NOTE — PROGRESS NOTE ADULT - ASSESSMENT
49yo un-domeciled M w/ PMH EtOH abuse, cocaine and IV heroin use, hepatitis C who presented for evaluation of pain, redness, drainage, and swelling to the L knee.

## 2019-01-24 NOTE — PROGRESS NOTE ADULT - ATTENDING COMMENTS
Pt seen and examined at bedside. Agree with exam, a/p as above with following addendum/edits:    Uncooperative on exam. Unsure if his knee feels better. On exam, febrile to 101, other VSS, disheveled, malodorous, sleeping and answering in one word answers, refused lung and cardiac exam, R knee with punctate lesion with minimal surrounding erythema and serosanguinous discharge.     48 year old M w/     1. Sepsis 2/2 cellulitis and abscess of skin of R knee  2. Polysubstance abuse  3. Opioid withdrawal  4. IVDA  5. Hep C  6. EtOH abuse Pt seen and examined at bedside. Agree with exam, a/p as above with following addendum/edits:    Uncooperative on exam. Unsure if his knee feels better. On exam, febrile to 101, other VSS, disheveled, malodorous, sleeping and answering in one word answers, refused lung and cardiac exam, L knee with punctate lesion with minimal surrounding erythema and serosanguinous discharge.     48 year old M w/     1. Sepsis 2/2 cellulitis and abscess of skin of L knee  2. Polysubstance abuse  3. Opioid withdrawal  4. IVDA  5. Hep C  6. EtOH abuse

## 2019-01-24 NOTE — PROGRESS NOTE ADULT - PROBLEM SELECTOR PLAN 3
Patient w/ history of drug abuse. Uses IV heroin daily, last used day prior to going to ED. Last used cocaine 2 days prior to admission  s/p Dilaudid at [facility]  - patient agitated this morning. Given methadone 10mg PO with good response. Will continue to evaluate and increase dose as needed

## 2019-01-24 NOTE — PROGRESS NOTE ADULT - PROBLEM SELECTOR PLAN 1
Patient presenting w/ sepsis likely 2/2 to R knee cellulitis and abscess. Reperfusion assessment completed w/ cap refill <2 sec. CXR negative and UA clean.  -Bcx NGTD  - wound culture with staph aureus - sensitivities pending  -Tylenol prn for fever  -Will increase vanc 750mg q12hr IV to 1000mg Q12h given low trough  -pending echo given IVDA to evaluate for murmur and vegetations to r/o endocarditis - but patient has refused despite multiple attempts to convince otherwise  -Wound care for knee - appreciate recs Patient presenting w/ sepsis likely 2/2 to L knee cellulitis and abscess. Reperfusion assessment completed w/ cap refill <2 sec. CXR negative and UA clean.  -Bcx NGTD  - wound culture with staph aureus - sensitivities pending  -Tylenol prn for fever  -Will increase vanc 750mg q12hr IV to 1000mg Q12h given low trough  -pending echo given IVDA to evaluate for murmur and vegetations to r/o endocarditis - but patient has refused despite multiple attempts to convince otherwise  -Wound care for knee - appreciate recs

## 2019-01-24 NOTE — ADVANCED PRACTICE NURSE CONSULT - RECOMMEDATIONS
Flush site daily then pack tunneling with 1/4" iodoform packing strips, cover with dry dressing. Spoke with [facility] and house staff.

## 2019-01-24 NOTE — PROGRESS NOTE ADULT - PROBLEM SELECTOR PLAN 4
Patient w/ hx of EtOH abuse. Drinks about 2-3 pints of vodka daily. Last drink was 2 days prior to admission  - states he has had seizures from withdrawal in the past  - high risk CIWA protocol  - currently CIWA 1-2 for agitation but mixed picture given opioid withdrawal. not tachycardic. Will continue to monitor and can give ativan PRN for CIWA>8

## 2019-01-24 NOTE — PROGRESS NOTE ADULT - SUBJECTIVE AND OBJECTIVE BOX
OVERNIGHT EVENTS: spiked fever to 101 but refused tylenol or icepacks.    SUBJECTIVE: Patient agitated this morning saying he is withdrawing from both opioids and alcohol. States he drinks 2-3 pints of vodka per day and has withdrawn from ETOH in the past most recently 6 months ago. Also endorses seizures in the past but unable/unwilling to say if he has been intubated.     Vital Signs Last 12 Hrs  T(F): 99.9 (19 @ 09:50), Max: 99.9 (19 @ 09:50)  HR: 81 (19 @ 09:50) (72 - 81)  BP: 124/76 (19 @ 09:50) (112/69 - 124/76)  BP(mean): --  RR: 17 (19 @ 09:50) (17 - 18)  SpO2: 96% (19 @ 09:50) (96% - 97%)  I&O's Summary      PHYSICAL EXAM:  Constitutional: agitated, disheveled male lying in bed, mildly tremulous  HEENT: NC/AT, PERRLA, EOMI, no conjunctival pallor or scleral icterus, MMM  Neck: Supple, no JVD  Respiratory: Normal rate, rhythm, depth, effort. CTAB. No w/r/r.   Cardiovascular: RRR, normal S1 and S2, no m/r/g.   Gastrointestinal: +BS, soft NTND, no guarding or rebound tenderness, no palpable masses   Extremities: L knee with open round wound (~5mm in diameter) draining blood stained purulent material, tender to palpation with surrounding erythema and warmth but no edema. Full range of motion  Vascular: Pulses equal and strong throughout.   Neurological: AAOx3, no CN deficits, strength and sensation intact throughout.   Skin: No gross skin abnormalities or rashes        LABS:                        12.4   7.6   )-----------( 240      ( 2019 07:24 )             38.0         140  |  103  |  7   ----------------------------<  86  3.7   |  26  |  0.84    Ca    8.6      2019 07:24  Phos  3.2       Mg     1.6         TPro  6.5  /  Alb  2.9<L>  /  TBili  0.3  /  DBili  x   /  AST  18  /  ALT  18  /  AlkPhos  55      PT/INR - ( 2019 00:14 )   PT: 14.5 sec;   INR: 1.30          PTT - ( 2019 00:14 )  PTT:30.4 sec  Urinalysis Basic - ( 2019 03:04 )    Color: Yellow / Appearance: Clear / S.010 / pH: x  Gluc: x / Ketone: NEGATIVE  / Bili: NEGATIVE / Urobili: 4.0 E.U./dL   Blood: x / Protein: Trace mg/dL / Nitrite: NEGATIVE   Leuk Esterase: NEGATIVE / RBC: < 5 /HPF / WBC 5-10 /HPF   Sq Epi: x / Non Sq Epi: 0-5 /HPF / Bacteria: Present /HPF        RADIOLOGY & ADDITIONAL TESTS:    MEDICATIONS  (STANDING):  heparin  Injectable 5000 Unit(s) SubCutaneous every 8 hours  methadone    Tablet 10 milliGRAM(s) Oral every 24 hours  vancomycin  IVPB 1000 milliGRAM(s) IV Intermittent every 12 hours    MEDICATIONS  (PRN):  acetaminophen   Tablet .. 650 milliGRAM(s) Oral every 6 hours PRN Temp greater or equal to 38C (100.4F)

## 2019-01-24 NOTE — ADVANCED PRACTICE NURSE CONSULT - ASSESSMENT
49yo un-domeciled M w/ PMH EtOH abuse, cocaine and IV heroin use, hepatitis C who presented for evaluation of pain, redness, drainage, and swelling to the L knee. Purulent drainage expressed from wound at left patella when palpated. Periwound erythema, pt c/o tenderness, no odor noted. Wound with tunneling approx 5 cm at 12 o'clock, slight undermining from 12-2 o'clock. Site flushed with NS syringe then patted dry, 1/4" Iodoform packing strips packed in tunneling area then covered with dry dressing, spandage to secure the dressing.

## 2019-01-25 VITALS
DIASTOLIC BLOOD PRESSURE: 68 MMHG | TEMPERATURE: 99 F | HEART RATE: 76 BPM | RESPIRATION RATE: 18 BRPM | SYSTOLIC BLOOD PRESSURE: 110 MMHG | OXYGEN SATURATION: 93 %

## 2019-01-25 LAB
ANION GAP SERPL CALC-SCNC: 11 MMOL/L — SIGNIFICANT CHANGE UP (ref 5–17)
BUN SERPL-MCNC: 12 MG/DL — SIGNIFICANT CHANGE UP (ref 7–23)
CALCIUM SERPL-MCNC: 8.4 MG/DL — SIGNIFICANT CHANGE UP (ref 8.4–10.5)
CHLORIDE SERPL-SCNC: 104 MMOL/L — SIGNIFICANT CHANGE UP (ref 96–108)
CO2 SERPL-SCNC: 25 MMOL/L — SIGNIFICANT CHANGE UP (ref 22–31)
CREAT SERPL-MCNC: 0.85 MG/DL — SIGNIFICANT CHANGE UP (ref 0.5–1.3)
GLUCOSE SERPL-MCNC: 100 MG/DL — HIGH (ref 70–99)
HCT VFR BLD CALC: 36.8 % — LOW (ref 39–50)
HGB BLD-MCNC: 12 G/DL — LOW (ref 13–17)
MAGNESIUM SERPL-MCNC: 1.8 MG/DL — SIGNIFICANT CHANGE UP (ref 1.6–2.6)
MCHC RBC-ENTMCNC: 27.9 PG — SIGNIFICANT CHANGE UP (ref 27–34)
MCHC RBC-ENTMCNC: 32.6 G/DL — SIGNIFICANT CHANGE UP (ref 32–36)
MCV RBC AUTO: 85.6 FL — SIGNIFICANT CHANGE UP (ref 80–100)
PLATELET # BLD AUTO: 229 K/UL — SIGNIFICANT CHANGE UP (ref 150–400)
POTASSIUM SERPL-MCNC: 4 MMOL/L — SIGNIFICANT CHANGE UP (ref 3.5–5.3)
POTASSIUM SERPL-SCNC: 4 MMOL/L — SIGNIFICANT CHANGE UP (ref 3.5–5.3)
RBC # BLD: 4.3 M/UL — SIGNIFICANT CHANGE UP (ref 4.2–5.8)
RBC # FLD: 13.9 % — SIGNIFICANT CHANGE UP (ref 10.3–16.9)
SODIUM SERPL-SCNC: 140 MMOL/L — SIGNIFICANT CHANGE UP (ref 135–145)
WBC # BLD: 6.5 K/UL — SIGNIFICANT CHANGE UP (ref 3.8–10.5)
WBC # FLD AUTO: 6.5 K/UL — SIGNIFICANT CHANGE UP (ref 3.8–10.5)

## 2019-01-25 PROCEDURE — 93010 ELECTROCARDIOGRAM REPORT: CPT

## 2019-01-25 PROCEDURE — 99239 HOSP IP/OBS DSCHRG MGMT >30: CPT

## 2019-01-25 RX ORDER — AZTREONAM 2 G
2 VIAL (EA) INJECTION
Qty: 20 | Refills: 0 | OUTPATIENT
Start: 2019-01-25 | End: 2019-01-29

## 2019-01-25 RX ORDER — MAGNESIUM SULFATE 500 MG/ML
1 VIAL (ML) INJECTION ONCE
Qty: 0 | Refills: 0 | Status: COMPLETED | OUTPATIENT
Start: 2019-01-25 | End: 2019-01-25

## 2019-01-25 RX ORDER — METHADONE HYDROCHLORIDE 40 MG/1
15 TABLET ORAL EVERY 24 HOURS
Qty: 0 | Refills: 0 | Status: DISCONTINUED | OUTPATIENT
Start: 2019-01-25 | End: 2019-01-25

## 2019-01-25 RX ADMIN — Medication 166.67 MILLIGRAM(S): at 06:22

## 2019-01-25 RX ADMIN — Medication 100 GRAM(S): at 09:36

## 2019-01-25 RX ADMIN — METHADONE HYDROCHLORIDE 15 MILLIGRAM(S): 40 TABLET ORAL at 10:18

## 2019-01-25 NOTE — DISCHARGE NOTE ADULT - MEDICATION SUMMARY - MEDICATIONS TO TAKE
I will START or STAY ON the medications listed below when I get home from the hospital:    Bactrim  mg-160 mg oral tablet  -- 2 tab(s) by mouth 2 times a day   -- Avoid prolonged or excessive exposure to direct and/or artificial sunlight while taking this medication.  Finish all this medication unless otherwise directed by prescriber.  Medication should be taken with plenty of water.    -- Indication: For Cellulitis and abscess

## 2019-01-25 NOTE — DISCHARGE NOTE ADULT - CARE PLAN
Principal Discharge DX:	Sepsis due to cellulitis  Goal:	To treat your infection  Assessment and plan of treatment:	You came in with a systemic inflammatory syndrome called sepsis. This was due to the infection in your left knee. You were treated with IV antibiotics and your infection improved. You will be discharged with 5 additional days of an oral antibiotic called bactrim. You should also pack the wound with packing once per day and cover with gauze until you are unable to pack it anymore.  Secondary Diagnosis:	Alcohol abuse  Assessment and plan of treatment:	You should try to abstain from drinking. We provided you with information for AA and other resources.  Secondary Diagnosis:	Heroin abuse  Assessment and plan of treatment:	You were withdrawing from heroine while you were in the hospital. We gave you methadone for your symptoms. We are unable to prescribe you methadone but we gave you information for methadone clinics. Principal Discharge DX:	Sepsis due to cellulitis  Goal:	To treat your infection  Assessment and plan of treatment:	You came in with a systemic inflammatory syndrome called sepsis. This was due to the infection in your left knee. You were treated with IV antibiotics and your infection improved. You will be discharged with 5 additional days of an oral antibiotic called bactrim. You should also pack the wound with packing once per day and cover with gauze until you are unable to pack it anymore.  Secondary Diagnosis:	Alcohol abuse  Assessment and plan of treatment:	You should try to abstain from drinking. We attempted to provide you with information for AA and other resources, but you were not interested in receiving this.  Secondary Diagnosis:	Heroin abuse  Assessment and plan of treatment:	You were withdrawing from heroine while you were in the hospital. We gave you methadone for your symptoms. We are unable to prescribe you methadone but we gave you information for methadone clinics.

## 2019-01-25 NOTE — DISCHARGE NOTE ADULT - PATIENT PORTAL LINK FT
You can access the [facility] Patient Portal, offered by [facility], by registering with the following website: [email or web address]

## 2019-01-25 NOTE — DISCHARGE NOTE ADULT - HOSPITAL COURSE
49yo undomeciled M w/ PMH EtOH abuse, cocaine and daily IV heroin use, hepatitis C who presented for evaluation of pain, redness, drainage, and swelling to the L knee found to be septic 2/2 to L knee abscess and cellulitis. Patient was treated with IV vancomycin to cover for MRSA given risk factor of homelessness. His fever curve downtrended and his cellulitis improved. Wound care nurse evaluated and suggested daily packing due to a 5cm long track from the wound. The Patient was also actively withdrawing from opioids so he was given methadone, which helped his symptoms. While he states he drinks 2-3 pints of vodka daily, he did not appear to be withdrawing from alcohol. He was provided with information for methadone clinics. He will be discharged with 5 additional days of bactrim DS for treatment of presumed MRSA. He was informed that he should follow up at [facility] medicine clinic. 49yo undomeciled M w/ PMH EtOH abuse, cocaine and daily IV heroin use, hepatitis C who presented for evaluation of pain, redness, drainage, and swelling to the L knee found to be septic 2/2 to L knee abscess and cellulitis. Patient was treated with IV vancomycin to cover for MRSA given risk factor of homelessness. His fever curve downtrended and his cellulitis improved. Wound care nurse evaluated and suggested daily packing due to a 5cm long track from the wound. The Patient was also actively withdrawing from opioids so he was given methadone, which helped his symptoms. While he states he drinks 2-3 pints of vodka daily, he did not appear to be withdrawing from alcohol. He was provided with information for methadone clinics, but he refused info for AA clinics. He will be discharged with 5 additional days of bactrim DS for treatment of presumed MRSA. He was informed that he should follow up at [facility] medicine clinic.

## 2019-01-25 NOTE — DISCHARGE NOTE ADULT - PLAN OF CARE
You should try to abstain from drinking. We provided you with information for AA and other resources. You were withdrawing from heroine while you were in the hospital. We gave you methadone for your symptoms. We are unable to prescribe you methadone but we gave you information for methadone clinics. To treat your infection You came in with a systemic inflammatory syndrome called sepsis. This was due to the infection in your left knee. You were treated with IV antibiotics and your infection improved. You will be discharged with 5 additional days of an oral antibiotic called bactrim. You should also pack the wound with packing once per day and cover with gauze until you are unable to pack it anymore. You should try to abstain from drinking. We attempted to provide you with information for AA and other resources, but you were not interested in receiving this.

## 2019-01-25 NOTE — DISCHARGE NOTE ADULT - ADDITIONAL INSTRUCTIONS
Please follow up at the general medicine clinic at [facility] within 1-2 weeks: [facility]; [address] ([address]), [place], [place] [id number]. Phone: [phone].

## 2019-01-26 LAB
-  AMPICILLIN/SULBACTAM: SIGNIFICANT CHANGE UP
-  CEFAZOLIN: SIGNIFICANT CHANGE UP
-  CLINDAMYCIN: SIGNIFICANT CHANGE UP
-  ERYTHROMYCIN: SIGNIFICANT CHANGE UP
-  GENTAMICIN: SIGNIFICANT CHANGE UP
-  OXACILLIN: SIGNIFICANT CHANGE UP
-  PENICILLIN: SIGNIFICANT CHANGE UP
-  RIFAMPIN: SIGNIFICANT CHANGE UP
-  TETRACYCLINE: SIGNIFICANT CHANGE UP
-  TRIMETHOPRIM/SULFAMETHOXAZOLE: SIGNIFICANT CHANGE UP
-  VANCOMYCIN: SIGNIFICANT CHANGE UP
METHOD TYPE: SIGNIFICANT CHANGE UP

## 2019-01-28 DIAGNOSIS — L03.116 CELLULITIS OF LEFT LOWER LIMB: ICD-10-CM

## 2019-01-28 DIAGNOSIS — Z59.0 HOMELESSNESS: ICD-10-CM

## 2019-01-28 DIAGNOSIS — F19.19 OTHER PSYCHOACTIVE SUBSTANCE ABUSE WITH UNSPECIFIED PSYCHOACTIVE SUBSTANCE-INDUCED DISORDER: ICD-10-CM

## 2019-01-28 DIAGNOSIS — B95.61 METHICILLIN SUSCEPTIBLE STAPHYLOCOCCUS AUREUS INFECTION AS THE CAUSE OF DISEASES CLASSIFIED ELSEWHERE: ICD-10-CM

## 2019-01-28 DIAGNOSIS — A41.9 SEPSIS, UNSPECIFIED ORGANISM: ICD-10-CM

## 2019-01-28 DIAGNOSIS — F14.19 COCAINE ABUSE WITH UNSPECIFIED COCAINE-INDUCED DISORDER: ICD-10-CM

## 2019-01-28 DIAGNOSIS — F10.10 ALCOHOL ABUSE, UNCOMPLICATED: ICD-10-CM

## 2019-01-28 DIAGNOSIS — B19.20 UNSPECIFIED VIRAL HEPATITIS C WITHOUT HEPATIC COMA: ICD-10-CM

## 2019-01-28 DIAGNOSIS — L02.416 CUTANEOUS ABSCESS OF LEFT LOWER LIMB: ICD-10-CM

## 2019-01-28 DIAGNOSIS — F11.10 OPIOID ABUSE, UNCOMPLICATED: ICD-10-CM

## 2019-01-28 LAB
CULTURE RESULTS: SIGNIFICANT CHANGE UP
ORGANISM # SPEC MICROSCOPIC CNT: SIGNIFICANT CHANGE UP
ORGANISM # SPEC MICROSCOPIC CNT: SIGNIFICANT CHANGE UP
SPECIMEN SOURCE: SIGNIFICANT CHANGE UP

## 2019-01-28 SDOH — ECONOMIC STABILITY - HOUSING INSECURITY: HOMELESSNESS: Z59.0

## 2022-07-01 NOTE — PATIENT PROFILE ADULT - NSPROMEDSADMININFO_GEN_A_NUR
no concerns Repair Performed By Another Provider Text (Leave Blank If You Do Not Want): After obtaining clear surgical margins the defect was repaired by another provider.
